# Patient Record
Sex: MALE | Race: WHITE | NOT HISPANIC OR LATINO | Employment: OTHER | URBAN - METROPOLITAN AREA
[De-identification: names, ages, dates, MRNs, and addresses within clinical notes are randomized per-mention and may not be internally consistent; named-entity substitution may affect disease eponyms.]

---

## 2022-01-14 ENCOUNTER — HOSPITAL ENCOUNTER (EMERGENCY)
Facility: HOSPITAL | Age: 77
Discharge: ANOTHER HOSPITAL | End: 2022-01-14
Attending: FAMILY MEDICINE
Payer: MEDICARE

## 2022-01-14 ENCOUNTER — HOSPITAL ENCOUNTER (INPATIENT)
Facility: HOSPITAL | Age: 77
LOS: 4 days | Discharge: HOME | DRG: 246 | End: 2022-01-18
Attending: FAMILY MEDICINE | Admitting: FAMILY MEDICINE
Payer: MEDICARE

## 2022-01-14 VITALS
TEMPERATURE: 98 F | HEIGHT: 72 IN | BODY MASS INDEX: 32.51 KG/M2 | DIASTOLIC BLOOD PRESSURE: 77 MMHG | SYSTOLIC BLOOD PRESSURE: 165 MMHG | OXYGEN SATURATION: 92 % | RESPIRATION RATE: 11 BRPM | HEART RATE: 55 BPM | WEIGHT: 240 LBS

## 2022-01-14 DIAGNOSIS — R07.9 CHEST PAIN: ICD-10-CM

## 2022-01-14 DIAGNOSIS — I21.4 NSTEMI (NON-ST ELEVATED MYOCARDIAL INFARCTION): Primary | ICD-10-CM

## 2022-01-14 DIAGNOSIS — I24.9 ACS (ACUTE CORONARY SYNDROME): Primary | ICD-10-CM

## 2022-01-14 DIAGNOSIS — R79.89 ELEVATED TROPONIN: ICD-10-CM

## 2022-01-14 DIAGNOSIS — I21.4 NSTEMI (NON-ST ELEVATED MYOCARDIAL INFARCTION): ICD-10-CM

## 2022-01-14 PROBLEM — G47.30 SLEEP APNEA: Chronic | Status: ACTIVE | Noted: 2022-01-14

## 2022-01-14 PROBLEM — F43.10 PTSD (POST-TRAUMATIC STRESS DISORDER): Status: ACTIVE | Noted: 2022-01-14

## 2022-01-14 PROBLEM — E11.9 TYPE 2 DIABETES MELLITUS, WITHOUT LONG-TERM CURRENT USE OF INSULIN: Status: ACTIVE | Noted: 2022-01-14

## 2022-01-14 PROBLEM — I25.10 CORONARY ARTERY DISEASE INVOLVING NATIVE CORONARY ARTERY: Chronic | Status: ACTIVE | Noted: 2022-01-14

## 2022-01-14 PROBLEM — F32.A DEPRESSION: Status: ACTIVE | Noted: 2022-01-14

## 2022-01-14 PROBLEM — I10 HTN (HYPERTENSION): Status: ACTIVE | Noted: 2022-01-14

## 2022-01-14 LAB
ALBUMIN SERPL BCP-MCNC: 3.7 G/DL (ref 3.5–5.2)
ALP SERPL-CCNC: 59 U/L (ref 55–135)
ALT SERPL W/O P-5'-P-CCNC: 33 U/L (ref 10–44)
ANION GAP SERPL CALC-SCNC: 12 MMOL/L (ref 8–16)
AST SERPL-CCNC: 20 U/L (ref 10–40)
BASOPHILS # BLD AUTO: 0.03 K/UL (ref 0–0.2)
BASOPHILS NFR BLD: 0.4 % (ref 0–1.9)
BILIRUB SERPL-MCNC: 0.3 MG/DL (ref 0.1–1)
BNP SERPL-MCNC: 22 PG/ML (ref 0–99)
BUN SERPL-MCNC: 23 MG/DL (ref 8–23)
CALCIUM SERPL-MCNC: 9.1 MG/DL (ref 8.7–10.5)
CHLORIDE SERPL-SCNC: 103 MMOL/L (ref 95–110)
CO2 SERPL-SCNC: 25 MMOL/L (ref 23–29)
CREAT SERPL-MCNC: 1 MG/DL (ref 0.5–1.4)
DIFFERENTIAL METHOD: NORMAL
EOSINOPHIL # BLD AUTO: 0.1 K/UL (ref 0–0.5)
EOSINOPHIL NFR BLD: 1.5 % (ref 0–8)
ERYTHROCYTE [DISTWIDTH] IN BLOOD BY AUTOMATED COUNT: 12.9 % (ref 11.5–14.5)
EST. GFR  (AFRICAN AMERICAN): >60 ML/MIN/1.73 M^2
EST. GFR  (NON AFRICAN AMERICAN): >60 ML/MIN/1.73 M^2
GLUCOSE SERPL-MCNC: 235 MG/DL (ref 70–110)
HCT VFR BLD AUTO: 42.9 % (ref 40–54)
HGB BLD-MCNC: 14.2 G/DL (ref 14–18)
IMM GRANULOCYTES # BLD AUTO: 0.03 K/UL (ref 0–0.04)
IMM GRANULOCYTES NFR BLD AUTO: 0.4 % (ref 0–0.5)
INR PPP: 1 (ref 0.8–1.2)
LYMPHOCYTES # BLD AUTO: 2.3 K/UL (ref 1–4.8)
LYMPHOCYTES NFR BLD: 31.8 % (ref 18–48)
MCH RBC QN AUTO: 29.4 PG (ref 27–31)
MCHC RBC AUTO-ENTMCNC: 33.1 G/DL (ref 32–36)
MCV RBC AUTO: 89 FL (ref 82–98)
MONOCYTES # BLD AUTO: 0.5 K/UL (ref 0.3–1)
MONOCYTES NFR BLD: 6.7 % (ref 4–15)
NEUTROPHILS # BLD AUTO: 4.4 K/UL (ref 1.8–7.7)
NEUTROPHILS NFR BLD: 59.2 % (ref 38–73)
NRBC BLD-RTO: 0 /100 WBC
PLATELET # BLD AUTO: 171 K/UL (ref 150–450)
PMV BLD AUTO: 10 FL (ref 9.2–12.9)
POTASSIUM SERPL-SCNC: 4.4 MMOL/L (ref 3.5–5.1)
PROT SERPL-MCNC: 6.8 G/DL (ref 6–8.4)
PROTHROMBIN TIME: 10.4 SEC (ref 9–12.5)
RBC # BLD AUTO: 4.83 M/UL (ref 4.6–6.2)
SARS-COV-2 RDRP RESP QL NAA+PROBE: NEGATIVE
SODIUM SERPL-SCNC: 140 MMOL/L (ref 136–145)
TROPONIN I SERPL DL<=0.01 NG/ML-MCNC: 0.01 NG/ML (ref 0–0.03)
TROPONIN I SERPL DL<=0.01 NG/ML-MCNC: 0.07 NG/ML (ref 0–0.03)
WBC # BLD AUTO: 7.36 K/UL (ref 3.9–12.7)

## 2022-01-14 PROCEDURE — 63600175 PHARM REV CODE 636 W HCPCS: Performed by: FAMILY MEDICINE

## 2022-01-14 PROCEDURE — 71045 XR CHEST AP PORTABLE: ICD-10-PCS | Mod: 26,,, | Performed by: RADIOLOGY

## 2022-01-14 PROCEDURE — 99285 EMERGENCY DEPT VISIT HI MDM: CPT | Mod: 25

## 2022-01-14 PROCEDURE — U0002 COVID-19 LAB TEST NON-CDC: HCPCS | Performed by: FAMILY MEDICINE

## 2022-01-14 PROCEDURE — 94761 N-INVAS EAR/PLS OXIMETRY MLT: CPT

## 2022-01-14 PROCEDURE — 25000003 PHARM REV CODE 250: Performed by: FAMILY MEDICINE

## 2022-01-14 PROCEDURE — 11000001 HC ACUTE MED/SURG PRIVATE ROOM

## 2022-01-14 PROCEDURE — 96375 TX/PRO/DX INJ NEW DRUG ADDON: CPT

## 2022-01-14 PROCEDURE — 85610 PROTHROMBIN TIME: CPT | Performed by: FAMILY MEDICINE

## 2022-01-14 PROCEDURE — 85025 COMPLETE CBC W/AUTO DIFF WBC: CPT | Performed by: FAMILY MEDICINE

## 2022-01-14 PROCEDURE — 93005 ELECTROCARDIOGRAM TRACING: CPT

## 2022-01-14 PROCEDURE — 84484 ASSAY OF TROPONIN QUANT: CPT | Mod: 91 | Performed by: FAMILY MEDICINE

## 2022-01-14 PROCEDURE — 71045 X-RAY EXAM CHEST 1 VIEW: CPT | Mod: 26,,, | Performed by: RADIOLOGY

## 2022-01-14 PROCEDURE — 36415 COLL VENOUS BLD VENIPUNCTURE: CPT | Performed by: FAMILY MEDICINE

## 2022-01-14 PROCEDURE — 82962 GLUCOSE BLOOD TEST: CPT

## 2022-01-14 PROCEDURE — 93010 EKG 12-LEAD: ICD-10-PCS | Mod: ,,, | Performed by: INTERNAL MEDICINE

## 2022-01-14 PROCEDURE — 96374 THER/PROPH/DIAG INJ IV PUSH: CPT

## 2022-01-14 PROCEDURE — 93010 ELECTROCARDIOGRAM REPORT: CPT | Mod: ,,, | Performed by: INTERNAL MEDICINE

## 2022-01-14 PROCEDURE — 71045 X-RAY EXAM CHEST 1 VIEW: CPT | Mod: TC,FY

## 2022-01-14 PROCEDURE — 80053 COMPREHEN METABOLIC PANEL: CPT | Performed by: FAMILY MEDICINE

## 2022-01-14 PROCEDURE — 83880 ASSAY OF NATRIURETIC PEPTIDE: CPT | Performed by: FAMILY MEDICINE

## 2022-01-14 RX ORDER — PRAZOSIN HYDROCHLORIDE 5 MG/1
5 CAPSULE ORAL NIGHTLY
COMMUNITY

## 2022-01-14 RX ORDER — ONDANSETRON 2 MG/ML
4 INJECTION INTRAMUSCULAR; INTRAVENOUS
Status: COMPLETED | OUTPATIENT
Start: 2022-01-14 | End: 2022-01-14

## 2022-01-14 RX ORDER — MELOXICAM 15 MG/1
15 TABLET ORAL DAILY
COMMUNITY

## 2022-01-14 RX ORDER — VENLAFAXINE 100 MG/1
TABLET ORAL 2 TIMES DAILY
COMMUNITY

## 2022-01-14 RX ORDER — GLIPIZIDE 10 MG/1
10 TABLET ORAL
COMMUNITY

## 2022-01-14 RX ORDER — ASPIRIN 81 MG/1
81 TABLET ORAL DAILY
COMMUNITY

## 2022-01-14 RX ORDER — MORPHINE SULFATE 4 MG/ML
4 INJECTION, SOLUTION INTRAMUSCULAR; INTRAVENOUS
Status: COMPLETED | OUTPATIENT
Start: 2022-01-14 | End: 2022-01-14

## 2022-01-14 RX ORDER — MORPHINE SULFATE 4 MG/ML
2 INJECTION, SOLUTION INTRAMUSCULAR; INTRAVENOUS
Status: COMPLETED | OUTPATIENT
Start: 2022-01-14 | End: 2022-01-14

## 2022-01-14 RX ORDER — LISINOPRIL 10 MG/1
10 TABLET ORAL DAILY
COMMUNITY

## 2022-01-14 RX ORDER — ATORVASTATIN CALCIUM 40 MG/1
40 TABLET, FILM COATED ORAL DAILY
Status: ON HOLD | COMMUNITY
End: 2022-01-18 | Stop reason: SDUPTHER

## 2022-01-14 RX ORDER — LANOLIN ALCOHOL/MO/W.PET/CERES
400 CREAM (GRAM) TOPICAL DAILY
COMMUNITY

## 2022-01-14 RX ORDER — NAPROXEN SODIUM 220 MG/1
324 TABLET, FILM COATED ORAL
Status: COMPLETED | OUTPATIENT
Start: 2022-01-14 | End: 2022-01-14

## 2022-01-14 RX ORDER — AMOXICILLIN 500 MG
CAPSULE ORAL DAILY
Status: ON HOLD | COMMUNITY
End: 2022-01-15

## 2022-01-14 RX ORDER — METFORMIN HYDROCHLORIDE 1000 MG/1
1000 TABLET ORAL 2 TIMES DAILY WITH MEALS
COMMUNITY

## 2022-01-14 RX ADMIN — MORPHINE SULFATE 2 MG: 4 INJECTION, SOLUTION INTRAMUSCULAR; INTRAVENOUS at 06:01

## 2022-01-14 RX ADMIN — ONDANSETRON 4 MG: 2 INJECTION INTRAMUSCULAR; INTRAVENOUS at 06:01

## 2022-01-14 RX ADMIN — MORPHINE SULFATE 4 MG: 4 INJECTION INTRAVENOUS at 07:01

## 2022-01-14 RX ADMIN — ASPIRIN 324 MG: 81 TABLET, CHEWABLE ORAL at 06:01

## 2022-01-14 NOTE — Clinical Note
The catheter was inserted into the ostium   left main. An angiography was performed of the left coronary arteries. Multiple views were taken. The angiography was performed via hand injection with 40 mL of contrast.

## 2022-01-14 NOTE — Clinical Note
The site was marked. The right groin was prepped. The site was prepped with ChloraPrep. The site was clipped. The patient was draped. The patient was positioned supine.

## 2022-01-14 NOTE — ED TRIAGE NOTES
Pt to ED with c/o chest pain that started around 0400 this morning, pt reports taking 2 nitro and 4 baby aspirin with no pain relief. Pt reports pain at 5/10 at this time, NAD noted at this time.

## 2022-01-14 NOTE — Clinical Note
The catheter was inserted into the left ventricle. Hemodynamics were performed.  and Pullback was recorded.  An angiography was performed of the LV. The angiography was performed via power injection. The injected amount was 30 mL contrast at 10 mL/s.

## 2022-01-14 NOTE — ED PROVIDER NOTES
Encounter Date: 1/14/2022       History     Chief Complaint   Patient presents with    Chest Pain     Onset  0430 this am took 2 nitro with baby asa no better     76-year-old male presents the ED complaining of dull left chest pain onset 0430, the patient took 2 nitro questions and a baby aspirin with no relief, he is visiting the area from [place] and is staying in a recreational vehicle at a local campground, has no history of nausea vomiting no syncope or near-syncope in no history of palpitations he is status post cardiac catheterization with stent placement apparently in the LAD in 2005 this was done in [place] he denies any prior similar pain recently and has had no unusual exertion or physical activity recently        Review of patient's allergies indicates:  No Known Allergies  No past medical history on file.  No past surgical history on file.  No family history on file.     Review of Systems   Constitutional: Negative for fever.   HENT: Negative for sore throat.    Respiratory: Negative for shortness of breath.    Cardiovascular: Negative for chest pain.   Gastrointestinal: Negative for nausea.   Genitourinary: Negative for dysuria.   Musculoskeletal: Negative for back pain.   Skin: Negative for rash.   Neurological: Negative for dizziness and weakness.   Hematological: Does not bruise/bleed easily.   Psychiatric/Behavioral: Negative for agitation.       Physical Exam     Initial Vitals   BP Pulse Resp Temp SpO2   01/14/22 0627 01/14/22 0627 01/14/22 0627 01/14/22 0732 01/14/22 0627   (!) 162/67 (!) 49 18 97.5 °F (36.4 °C) 96 %      MAP       --                Physical Exam    Nursing note and vitals reviewed.  Constitutional: He appears well-developed and well-nourished. He is not diaphoretic. No distress.   HENT:   Head: Normocephalic and atraumatic.   Nose: Nose normal.   Mouth/Throat: Oropharynx is clear and moist. No oropharyngeal exudate.   Eyes: EOM are normal.   Neck: Neck supple. No tracheal  deviation present.   Normal range of motion.  Cardiovascular: Normal rate and regular rhythm.   No murmur heard.  Pulmonary/Chest: Breath sounds normal. No stridor. No respiratory distress. He has no rales.   Abdominal: Abdomen is soft. He exhibits no distension and no mass. There is no abdominal tenderness. There is no rebound.   Musculoskeletal:         General: No edema. Normal range of motion.      Cervical back: Normal range of motion and neck supple.     Lymphadenopathy:     He has no cervical adenopathy.   Neurological: He is alert and oriented to person, place, and time. He has normal strength.   Skin: Skin is warm and dry. Capillary refill takes less than 2 seconds. No pallor.   Psychiatric: He has a normal mood and affect.         ED Course   Procedures  Labs Reviewed   COMPREHENSIVE METABOLIC PANEL - Abnormal; Notable for the following components:       Result Value    Glucose 235 (*)     All other components within normal limits   TROPONIN I - Abnormal; Notable for the following components:    Troponin I 0.065 (*)     All other components within normal limits    Narrative:     Recoll. [id number] by SABINA at 01/14/2022 10:27, reason: Insufficient   specimen   CBC W/ AUTO DIFFERENTIAL   B-TYPE NATRIURETIC PEPTIDE   TROPONIN I   PROTIME-INR     EKG Readings: (Independently Interpreted)   Initial Reading: No STEMI. Rhythm: Sinus Bradycardia. Heart Rate: 48. Ectopy: No Ectopy. Conduction: Normal. ST Segments: Normal ST Segments. T Waves: Normal.     ECG Results          EKG 12-lead (Final result)  Result time 01/14/22 08:26:59    Final result by Interface, Lab In [facility] (01/14/22 08:26:59)                 Narrative:    Test Reason : R07.9,    Vent. Rate : 048 BPM     Atrial Rate : 048 BPM     P-R Int : 192 ms          QRS Dur : 096 ms      QT Int : 474 ms       P-R-T Axes : 037 004 060 degrees     QTc Int : 423 ms    Sinus bradycardia  Cannot rule out Anterior infarct ,age undetermined  Abnormal  ECG    Confirmed by Omer Espino MD ([id number]) on 1/14/2022 8:26:52 AM    Referred By: [email or web address]   SELF           Confirmed By:Omer Espino MD                            Imaging Results          X-Ray Chest AP Portable (Final result)  Result time 01/14/22 07:48:20   Procedure changed from X-Ray Chest PA And Lateral     Final result by Robert Gomes MD (01/14/22 07:48:20)                 Impression:      No acute abnormality.      Electronically signed by: Robert Gomes  Date:    01/14/2022  Time:    07:48             Narrative:    EXAMINATION:  XR CHEST AP PORTABLE    CLINICAL HISTORY:  Chest Pain;.    TECHNIQUE:  Single frontal portable view of the chest was performed.    COMPARISON:  None    FINDINGS:  Support devices: None    The lungs are clear, with normal appearance of pulmonary vasculature and no pleural effusion or pneumothorax.    The cardiac silhouette is normal in size. The hilar and mediastinal contours are unremarkable.    Bones are intact.                                 Medications   aspirin chewable tablet 324 mg (324 mg Oral Given 1/14/22 0656)   ondansetron injection 4 mg (4 mg Intravenous Given 1/14/22 0655)   morphine injection 2 mg (2 mg Intravenous Given 1/14/22 0655)   morphine injection 4 mg (4 mg Intravenous Given 1/14/22 0724)   The evaluation, management and treatment of this patient involved Critical Care services  amounting to 75  minutes of direct involvement.  This time was exclusive of any billable procedures.              ED Course as of 01/16/22 1347   Fri Jan 14, 2022   0756 pt pain is down to a 310 [WK]   1057 Troponin I [WK]   1234 Pain is down to 2/10 [WK]   1600 Case discussed with [facility] pilot Dr.Marier, patient will be transferred to [facility] [WK]      ED Course User Index  [WK] Jamin Jose MD           The evaluation, management and treatment of this patient involved Critical Care services  amounting to 75 minutes of direct involvement.  This time  was exclusive of any billable procedures.  Clinical Impression:   Final diagnoses:  [R07.9] Chest pain  [I21.4] NSTEMI (non-ST elevated myocardial infarction) (Primary)          ED Disposition Condition    Transfer to Another Facility Stable              Jamin Jose MD  01/14/22 1340       Jamin Jose MD  01/16/22 1348       Jamin Jose MD  02/09/22 0252

## 2022-01-14 NOTE — Clinical Note
The catheter was repositioned into the ostium   right coronary artery. An angiography was performed of the right coronary arteries. Multiple views were taken. The angiography was performed via hand injection with 20 mL of contrast.

## 2022-01-14 NOTE — Clinical Note
The catheter was inserted into the ostium   left main. An angiography was performed of the left coronary arteries. The angiography was performed via hand injection with 10 mL of contrast.   Wound Care (No Sutures): Petrolatum

## 2022-01-14 NOTE — Clinical Note
An angiography was performed of the left coronary arteries. The angiography was performed via hand injection with 20 mL of contrast.

## 2022-01-15 PROBLEM — I24.9 ACS (ACUTE CORONARY SYNDROME): Status: RESOLVED | Noted: 2022-01-15 | Resolved: 2022-01-15

## 2022-01-15 PROBLEM — I24.9 ACS (ACUTE CORONARY SYNDROME): Status: ACTIVE | Noted: 2022-01-15

## 2022-01-15 LAB
ALBUMIN SERPL BCP-MCNC: 3.5 G/DL (ref 3.5–5.2)
ALP SERPL-CCNC: 60 U/L (ref 55–135)
ALT SERPL W/O P-5'-P-CCNC: 36 U/L (ref 10–44)
ANION GAP SERPL CALC-SCNC: 9 MMOL/L (ref 8–16)
APTT BLDCRRT: 28.3 SEC (ref 21–32)
AST SERPL-CCNC: 44 U/L (ref 10–40)
BASOPHILS # BLD AUTO: 0.03 K/UL (ref 0–0.2)
BASOPHILS NFR BLD: 0.3 % (ref 0–1.9)
BILIRUB SERPL-MCNC: 0.3 MG/DL (ref 0.1–1)
BUN SERPL-MCNC: 25 MG/DL (ref 8–23)
CALCIUM SERPL-MCNC: 8.9 MG/DL (ref 8.7–10.5)
CHLORIDE SERPL-SCNC: 106 MMOL/L (ref 95–110)
CHOLEST SERPL-MCNC: 167 MG/DL (ref 120–199)
CHOLEST/HDLC SERPL: 4.2 {RATIO} (ref 2–5)
CO2 SERPL-SCNC: 24 MMOL/L (ref 23–29)
CREAT SERPL-MCNC: 0.8 MG/DL (ref 0.5–1.4)
DIFFERENTIAL METHOD: NORMAL
EOSINOPHIL # BLD AUTO: 0.1 K/UL (ref 0–0.5)
EOSINOPHIL NFR BLD: 1.1 % (ref 0–8)
ERYTHROCYTE [DISTWIDTH] IN BLOOD BY AUTOMATED COUNT: 13 % (ref 11.5–14.5)
EST. GFR  (AFRICAN AMERICAN): >60 ML/MIN/1.73 M^2
EST. GFR  (NON AFRICAN AMERICAN): >60 ML/MIN/1.73 M^2
ESTIMATED AVG GLUCOSE: 171 MG/DL (ref 68–131)
GLUCOSE SERPL-MCNC: 119 MG/DL (ref 70–110)
HBA1C MFR BLD: 7.6 % (ref 4–5.6)
HCT VFR BLD AUTO: 43.3 % (ref 40–54)
HDLC SERPL-MCNC: 40 MG/DL (ref 40–75)
HDLC SERPL: 24 % (ref 20–50)
HGB BLD-MCNC: 14.1 G/DL (ref 14–18)
IMM GRANULOCYTES # BLD AUTO: 0.02 K/UL (ref 0–0.04)
IMM GRANULOCYTES NFR BLD AUTO: 0.2 % (ref 0–0.5)
INR PPP: 1 (ref 0.8–1.2)
LDLC SERPL CALC-MCNC: 94.2 MG/DL (ref 63–159)
LYMPHOCYTES # BLD AUTO: 2.8 K/UL (ref 1–4.8)
LYMPHOCYTES NFR BLD: 31.6 % (ref 18–48)
MAGNESIUM SERPL-MCNC: 1.8 MG/DL (ref 1.6–2.6)
MCH RBC QN AUTO: 28.7 PG (ref 27–31)
MCHC RBC AUTO-ENTMCNC: 32.6 G/DL (ref 32–36)
MCV RBC AUTO: 88 FL (ref 82–98)
MONOCYTES # BLD AUTO: 0.6 K/UL (ref 0.3–1)
MONOCYTES NFR BLD: 7.2 % (ref 4–15)
NEUTROPHILS # BLD AUTO: 5.3 K/UL (ref 1.8–7.7)
NEUTROPHILS NFR BLD: 59.6 % (ref 38–73)
NONHDLC SERPL-MCNC: 127 MG/DL
NRBC BLD-RTO: 0 /100 WBC
PHOSPHATE SERPL-MCNC: 3.3 MG/DL (ref 2.7–4.5)
PLATELET # BLD AUTO: 203 K/UL (ref 150–450)
PMV BLD AUTO: 10.2 FL (ref 9.2–12.9)
POCT GLUCOSE: 105 MG/DL (ref 70–110)
POCT GLUCOSE: 206 MG/DL (ref 70–110)
POCT GLUCOSE: 225 MG/DL (ref 70–110)
POCT GLUCOSE: 253 MG/DL (ref 70–110)
POTASSIUM SERPL-SCNC: 4 MMOL/L (ref 3.5–5.1)
PROT SERPL-MCNC: 6.9 G/DL (ref 6–8.4)
PROTHROMBIN TIME: 10.3 SEC (ref 9–12.5)
RBC # BLD AUTO: 4.91 M/UL (ref 4.6–6.2)
SODIUM SERPL-SCNC: 139 MMOL/L (ref 136–145)
TRIGL SERPL-MCNC: 164 MG/DL (ref 30–150)
TROPONIN I SERPL DL<=0.01 NG/ML-MCNC: 3.76 NG/ML (ref 0–0.03)
TROPONIN I SERPL DL<=0.01 NG/ML-MCNC: 4.92 NG/ML (ref 0–0.03)
TROPONIN I SERPL DL<=0.01 NG/ML-MCNC: 5.13 NG/ML (ref 0–0.03)
TROPONIN I SERPL DL<=0.01 NG/ML-MCNC: 5.63 NG/ML (ref 0–0.03)
TROPONIN I SERPL DL<=0.01 NG/ML-MCNC: 5.63 NG/ML (ref 0–0.03)
WBC # BLD AUTO: 8.95 K/UL (ref 3.9–12.7)

## 2022-01-15 PROCEDURE — 99223 1ST HOSP IP/OBS HIGH 75: CPT | Mod: ,,, | Performed by: INTERNAL MEDICINE

## 2022-01-15 PROCEDURE — 93005 ELECTROCARDIOGRAM TRACING: CPT

## 2022-01-15 PROCEDURE — 11000001 HC ACUTE MED/SURG PRIVATE ROOM

## 2022-01-15 PROCEDURE — 84484 ASSAY OF TROPONIN QUANT: CPT | Mod: 91 | Performed by: NURSE PRACTITIONER

## 2022-01-15 PROCEDURE — 93010 ELECTROCARDIOGRAM REPORT: CPT | Mod: ,,, | Performed by: INTERNAL MEDICINE

## 2022-01-15 PROCEDURE — 63600175 PHARM REV CODE 636 W HCPCS: Performed by: INTERNAL MEDICINE

## 2022-01-15 PROCEDURE — 25000003 PHARM REV CODE 250: Performed by: NURSE PRACTITIONER

## 2022-01-15 PROCEDURE — 85730 THROMBOPLASTIN TIME PARTIAL: CPT | Performed by: NURSE PRACTITIONER

## 2022-01-15 PROCEDURE — 36415 COLL VENOUS BLD VENIPUNCTURE: CPT | Performed by: NURSE PRACTITIONER

## 2022-01-15 PROCEDURE — 83735 ASSAY OF MAGNESIUM: CPT | Performed by: NURSE PRACTITIONER

## 2022-01-15 PROCEDURE — 99223 PR INITIAL HOSPITAL CARE,LEVL III: ICD-10-PCS | Mod: ,,, | Performed by: INTERNAL MEDICINE

## 2022-01-15 PROCEDURE — 84100 ASSAY OF PHOSPHORUS: CPT | Performed by: NURSE PRACTITIONER

## 2022-01-15 PROCEDURE — 85610 PROTHROMBIN TIME: CPT | Performed by: NURSE PRACTITIONER

## 2022-01-15 PROCEDURE — 63600175 PHARM REV CODE 636 W HCPCS: Performed by: NURSE PRACTITIONER

## 2022-01-15 PROCEDURE — 80053 COMPREHEN METABOLIC PANEL: CPT | Performed by: NURSE PRACTITIONER

## 2022-01-15 PROCEDURE — 80061 LIPID PANEL: CPT | Performed by: NURSE PRACTITIONER

## 2022-01-15 PROCEDURE — 83036 HEMOGLOBIN GLYCOSYLATED A1C: CPT | Performed by: NURSE PRACTITIONER

## 2022-01-15 PROCEDURE — 93010 EKG 12-LEAD: ICD-10-PCS | Mod: ,,, | Performed by: INTERNAL MEDICINE

## 2022-01-15 PROCEDURE — 25000003 PHARM REV CODE 250: Performed by: INTERNAL MEDICINE

## 2022-01-15 PROCEDURE — 85025 COMPLETE CBC W/AUTO DIFF WBC: CPT | Performed by: NURSE PRACTITIONER

## 2022-01-15 RX ORDER — LISINOPRIL 10 MG/1
10 TABLET ORAL DAILY
Status: DISCONTINUED | OUTPATIENT
Start: 2022-01-15 | End: 2022-01-18 | Stop reason: HOSPADM

## 2022-01-15 RX ORDER — OMEGA-3 FATTY ACIDS 1000 MG
2 CAPSULE ORAL
COMMUNITY

## 2022-01-15 RX ORDER — OMEPRAZOLE 20 MG/1
1 CAPSULE, DELAYED RELEASE ORAL DAILY
COMMUNITY
Start: 2021-08-15

## 2022-01-15 RX ORDER — CLOPIDOGREL BISULFATE 75 MG/1
300 TABLET ORAL ONCE
Status: COMPLETED | OUTPATIENT
Start: 2022-01-15 | End: 2022-01-15

## 2022-01-15 RX ORDER — ACETAMINOPHEN 160 MG/5ML
200 SUSPENSION, ORAL (FINAL DOSE FORM) ORAL
COMMUNITY

## 2022-01-15 RX ORDER — ONDANSETRON 2 MG/ML
4 INJECTION INTRAMUSCULAR; INTRAVENOUS EVERY 8 HOURS PRN
Status: DISCONTINUED | OUTPATIENT
Start: 2022-01-15 | End: 2022-01-18 | Stop reason: HOSPADM

## 2022-01-15 RX ORDER — GLUCAGON 1 MG
1 KIT INJECTION
Status: DISCONTINUED | OUTPATIENT
Start: 2022-01-15 | End: 2022-01-18 | Stop reason: HOSPADM

## 2022-01-15 RX ORDER — AMOXICILLIN 500 MG/1
TABLET, FILM COATED ORAL
Status: ON HOLD | COMMUNITY
Start: 2021-12-07 | End: 2022-01-15

## 2022-01-15 RX ORDER — DOXEPIN HYDROCHLORIDE 10 MG/1
10 CAPSULE ORAL
COMMUNITY

## 2022-01-15 RX ORDER — ACETAMINOPHEN 500 MG
TABLET ORAL
COMMUNITY

## 2022-01-15 RX ORDER — SODIUM CHLORIDE 0.9 % (FLUSH) 0.9 %
10 SYRINGE (ML) INJECTION
Status: DISCONTINUED | OUTPATIENT
Start: 2022-01-15 | End: 2022-01-18 | Stop reason: HOSPADM

## 2022-01-15 RX ORDER — MIRTAZAPINE 7.5 MG/1
30 TABLET, FILM COATED ORAL NIGHTLY PRN
Status: DISCONTINUED | OUTPATIENT
Start: 2022-01-15 | End: 2022-01-18 | Stop reason: HOSPADM

## 2022-01-15 RX ORDER — CLOPIDOGREL BISULFATE 75 MG/1
75 TABLET ORAL DAILY
Status: DISCONTINUED | OUTPATIENT
Start: 2022-01-16 | End: 2022-01-18 | Stop reason: HOSPADM

## 2022-01-15 RX ORDER — ENOXAPARIN SODIUM 100 MG/ML
1 INJECTION SUBCUTANEOUS
Status: DISCONTINUED | OUTPATIENT
Start: 2022-01-15 | End: 2022-01-17

## 2022-01-15 RX ORDER — ROSUVASTATIN CALCIUM 20 MG/1
20 TABLET, COATED ORAL
Status: ON HOLD | COMMUNITY
End: 2022-01-15

## 2022-01-15 RX ORDER — ACETAMINOPHEN 325 MG/1
650 TABLET ORAL EVERY 4 HOURS PRN
Status: DISCONTINUED | OUTPATIENT
Start: 2022-01-15 | End: 2022-01-18 | Stop reason: HOSPADM

## 2022-01-15 RX ORDER — ACETAMINOPHEN 500 MG
500 TABLET ORAL EVERY 4 HOURS PRN
COMMUNITY

## 2022-01-15 RX ORDER — ASPIRIN 81 MG/1
81 TABLET ORAL DAILY
Status: DISCONTINUED | OUTPATIENT
Start: 2022-01-15 | End: 2022-01-15

## 2022-01-15 RX ORDER — ATORVASTATIN CALCIUM 40 MG/1
40 TABLET, FILM COATED ORAL DAILY
Status: DISCONTINUED | OUTPATIENT
Start: 2022-01-15 | End: 2022-01-18 | Stop reason: HOSPADM

## 2022-01-15 RX ORDER — CHOLECALCIFEROL (VITAMIN D3) 25 MCG
TABLET ORAL
COMMUNITY

## 2022-01-15 RX ORDER — INSULIN ASPART 100 [IU]/ML
0-5 INJECTION, SOLUTION INTRAVENOUS; SUBCUTANEOUS EVERY 6 HOURS PRN
Status: DISCONTINUED | OUTPATIENT
Start: 2022-01-15 | End: 2022-01-18 | Stop reason: HOSPADM

## 2022-01-15 RX ORDER — CANAGLIFLOZIN 300 MG/1
TABLET, FILM COATED ORAL
COMMUNITY
Start: 2021-11-14

## 2022-01-15 RX ORDER — TALC
6 POWDER (GRAM) TOPICAL NIGHTLY PRN
Status: DISCONTINUED | OUTPATIENT
Start: 2022-01-15 | End: 2022-01-18 | Stop reason: HOSPADM

## 2022-01-15 RX ORDER — METOPROLOL TARTRATE 25 MG/1
25 TABLET, FILM COATED ORAL
COMMUNITY

## 2022-01-15 RX ORDER — ATORVASTATIN CALCIUM 40 MG/1
80 TABLET, FILM COATED ORAL DAILY
Status: DISCONTINUED | OUTPATIENT
Start: 2022-01-15 | End: 2022-01-15

## 2022-01-15 RX ORDER — ASPIRIN 81 MG/1
81 TABLET ORAL DAILY
Status: DISCONTINUED | OUTPATIENT
Start: 2022-01-15 | End: 2022-01-18 | Stop reason: HOSPADM

## 2022-01-15 RX ORDER — ENOXAPARIN SODIUM 100 MG/ML
40 INJECTION SUBCUTANEOUS EVERY 24 HOURS
Status: DISCONTINUED | OUTPATIENT
Start: 2022-01-15 | End: 2022-01-15

## 2022-01-15 RX ORDER — EPINEPHRINE 0.22MG
200 AEROSOL WITH ADAPTER (ML) INHALATION
Status: ON HOLD | COMMUNITY
End: 2022-01-15

## 2022-01-15 RX ORDER — MIRTAZAPINE 30 MG/1
30 TABLET, FILM COATED ORAL
COMMUNITY

## 2022-01-15 RX ORDER — METOPROLOL TARTRATE 25 MG/1
25 TABLET, FILM COATED ORAL DAILY
Status: DISCONTINUED | OUTPATIENT
Start: 2022-01-15 | End: 2022-01-18 | Stop reason: HOSPADM

## 2022-01-15 RX ADMIN — ATORVASTATIN CALCIUM 40 MG: 40 TABLET, FILM COATED ORAL at 08:01

## 2022-01-15 RX ADMIN — ASPIRIN 81 MG: 81 TABLET, COATED ORAL at 08:01

## 2022-01-15 RX ADMIN — LISINOPRIL 10 MG: 10 TABLET ORAL at 08:01

## 2022-01-15 RX ADMIN — VENLAFAXINE 100 MG: 25 TABLET ORAL at 08:01

## 2022-01-15 RX ADMIN — ENOXAPARIN SODIUM 100 MG: 100 INJECTION SUBCUTANEOUS at 12:01

## 2022-01-15 RX ADMIN — METOPROLOL TARTRATE 25 MG: 25 TABLET, FILM COATED ORAL at 08:01

## 2022-01-15 RX ADMIN — CLOPIDOGREL 300 MG: 75 TABLET, FILM COATED ORAL at 12:01

## 2022-01-15 RX ADMIN — INSULIN ASPART 3 UNITS: 100 INJECTION, SOLUTION INTRAVENOUS; SUBCUTANEOUS at 12:01

## 2022-01-15 RX ADMIN — ENOXAPARIN SODIUM 100 MG: 100 INJECTION SUBCUTANEOUS at 11:01

## 2022-01-15 NOTE — NURSING
Received pt to room 479 via EMS transport from [place]. Pt admission completed at this time.Telemetry placed and on call service notified of pts arrival. Call light and personal items are placed within pts reach.Instructed the pt to call for assistance before attempting to get out of the bed. Pt verbalizes understanding. Bed alarm set.Pt vital signs are stable and denies any chest or any discomfort at this time. Will continue to monitor.

## 2022-01-15 NOTE — CARE UPDATE
Call from bedside nurse Marisa in regard to troponin result. PTA 0.006-0.065 now at 5.634. EKG without major acute changes, vitals with slight hypertension yet not drastically elevated, denies CP, SOB or any discomfort. Spoke to Dr. Seymour in this matter and orders were to remain as is with no new orders and to continue to monitor and notify him if any changes.

## 2022-01-15 NOTE — HPI
Hilario Vivar is a 76 year old male with a PMHx of Hilario Vivar is a 76 y.o. male patient with a past medical history of CAD (AMI 1980's, proximal LAD PCI 1995, distal circ stents 2005), PAF/atrial flutter s/p ablation, HTN, HLD, Osteoarthritis and DM who presented to [place] ED ~ 9:30 am on 1/14/21 with complaints of chest pain, unrelieved by nitroglycerin SL X 3 and 4 81 mg Aspirin. The chest pain started about 4:30 am. Patient reports taking nitro and aspiring but with no relief of pain. Denies pain radiation reports substernal pain. Pain was reported 6/10 then decreased to 2/10 after morphine and  mg. While there he was noted to have VS (0627 h) HR 49, /67, RR 18, SpO2 96% (RA).  Labs:  CBC and CMP unremarkable except for Glu 235, troponin 0.006 > 0.065, BNP 22, COVID neg. EKG sinus bradycardia (48) with no acute changes, CXR clear. Patient given aspirin (324) and morphine. Over the next few h the pain improved (6 > 2).  Patient resting comfortably.  Transfer requested for higher level of care (cardiology).

## 2022-01-15 NOTE — NURSING
Pt.s Troponin level increased. On call notified of changes.Stat EKG ordered at this time. Pt vital signs are stable and he denies chest pain ,pressure or discomfort at this time. Will continue to monitor.

## 2022-01-15 NOTE — PROGRESS NOTES
[facility]  Progress Note    Patient Name: Hilario Vivar  MRN: [id number]  Patient Class: IP- Inpatient   Admission Date: 1/14/2022  Length of Stay: 1 days  Attending Physician: Patsy Burkett*  Primary Care Provider: Arthur Cavazos MD        Subjective:     Principal Problem:NSTEMI (non-ST elevated myocardial infarction)        HPI:  Hilario Vivar is a 76 year old male with a PMHx of Hilario Vivar is a 76 y.o. male patient with a past medical history of CAD (AMI 1980's, proximal LAD PCI 1995, distal circ stents 2005), PAF/atrial flutter s/p ablation, HTN, HLD, Osteoarthritis and DM who presented to [place] ED ~ 9:30 am on 1/14/21 with complaints of chest pain, unrelieved by nitroglycerin SL X 3 and 4 81 mg Aspirin. The chest pain started about 4:30 am. Patient reports taking nitro and aspiring but with no relief of pain. Denies pain radiation reports substernal pain. Pain was reported 6/10 then decreased to 2/10 after morphine and  mg. While there he was noted to have VS (0627 h) HR 49, /67, RR 18, SpO2 96% (RA).  Labs:  CBC and CMP unremarkable except for Glu 235, troponin 0.006 > 0.065, BNP 22, COVID neg. EKG sinus bradycardia (48) with no acute changes, CXR clear. Patient given aspirin (324) and morphine. Over the next few h the pain improved (6 > 2).  Patient resting comfortably.  Transfer requested for higher level of care (cardiology).                       Overview/Hospital Course:  No notes on file    Interval History: Seen at the bedside--no distress noted. Complains of chest soreness. Awaiting cardiology eval.     Review of Systems   Constitutional: Negative for diaphoresis and fatigue.   HENT: Negative for trouble swallowing.    Eyes: Negative for visual disturbance.   Respiratory: Negative for cough and shortness of breath.    Cardiovascular: Positive for chest pain.   Gastrointestinal: Negative for diarrhea, nausea and vomiting.    Genitourinary: Negative for difficulty urinating.   Musculoskeletal: Negative for gait problem and myalgias.   Neurological: Positive for weakness.   Hematological: Does not bruise/bleed easily.   Psychiatric/Behavioral: Negative for confusion.     Objective:     Vital Signs (Most Recent):  Temp: 98.6 °F (37 °C) (01/15/22 1224)  Pulse: 72 (01/15/22 1224)  Resp: 20 (01/15/22 1224)  BP: 133/67 (01/15/22 1224)  SpO2: 96 % (01/15/22 1224) Vital Signs (24h Range):  Temp:  [95.8 °F (35.4 °C)-98.6 °F (37 °C)] 98.6 °F (37 °C)  Pulse:  [53-73] 72  Resp:  [7-20] 20  SpO2:  [83 %-96 %] 96 %  BP: (116-165)/(61-80) 133/67     Weight: 103 kg (227 lb 1.2 oz)  Body mass index is 30.8 kg/m².  No intake or output data in the 24 hours ending 01/15/22 1334   Physical Exam  Vitals and nursing note reviewed.   Constitutional:       Appearance: He is obese.   HENT:      Head: Normocephalic and atraumatic.      Mouth/Throat:      Mouth: Mucous membranes are moist.   Eyes:      Extraocular Movements: Extraocular movements intact.      Conjunctiva/sclera: Conjunctivae normal.   Cardiovascular:      Rate and Rhythm: Normal rate and regular rhythm.      Pulses: Normal pulses.   Pulmonary:      Effort: Pulmonary effort is normal. No respiratory distress.   Abdominal:      General: There is no distension.      Tenderness: There is no abdominal tenderness.   Musculoskeletal:         General: Normal range of motion.      Cervical back: Normal range of motion.   Skin:     General: Skin is warm and dry.      Capillary Refill: Capillary refill takes 2 to 3 seconds.   Neurological:      Mental Status: He is alert and oriented to person, place, and time.   Psychiatric:         Mood and Affect: Mood normal.         Significant Labs: All pertinent labs within the past 24 hours have been reviewed.    Significant Imaging: I have reviewed all pertinent imaging results/findings within the past 24 hours.      Assessment/Plan:      * NSTEMI (non-ST elevated  myocardial infarction)  Transferred from [place] for ACS work up  History of MI in 80s treated medically   Troponin 0.006-0.065-5.634  Received  mg and morphine PTA with some pain relief, EKG with Sinus bradycardia with 1st degree A-V block. Cannot rule out Anterior infarct  No current complaints of CP or SOB  With last troponin level increase repeat EKG with: Sinus rhythm with 1st degree A-V block with occasional Premature ventricular complexes. Possible Anterior infarct  -trend troponin  -trend EKG  -NPO  -ECHO pending  -appreciate cardiology  -loaded with Plavix  -full dose Lovenox        Sleep apnea  CPAP QHS      PTSD (post-traumatic stress disorder)  Depression    Takes prazosin, Effexor, Remeron, at home  Will resume      Type 2 diabetes mellitus, without long-term current use of insulin  Hemoglobin A1C   Date Value Ref Range Status   01/15/2022 7.6 (H) 4.0 - 5.6 % Final     Comment:     ADA Screening Guidelines:  5.7-6.4%  Consistent with prediabetes  >or=6.5%  Consistent with diabetes    High levels of fetal hemoglobin interfere with the HbA1C  assay. Heterozygous hemoglobin variants (HbS, HgC, etc)do  not significantly interfere with this assay.   However, presence of multiple variants may affect accuracy.       -A1C pending, SSI, accuchecks Q6 while NPO, diabetic diet once allowed to eat      HTN (hypertension)  Hypertensive on admission  Takes lisinopril, metoprolol at home-resume and monitor      Coronary artery disease involving native coronary artery  PCI proximal LAD in 1995, Coronary stents in distal circumflex 2005   Continue home ASA, coreg, statin        VTE Risk Mitigation (From admission, onward)         Ordered     enoxaparin injection 100 mg  Every 12 hours (non-standard times)         01/15/22 1127     IP VTE HIGH RISK PATIENT  Once         01/15/22 0015     Place sequential compression device  Until discontinued         01/15/22 0015                Discharge Planning   ALONDRA:      Code  Status: Full Code   Is the patient medically ready for discharge?:     Reason for patient still in hospital (select all that apply): Patient trending condition, Laboratory test, Treatment, Imaging and Consult recommendations                     Sandra Neely NP  Department of [facility]   [facility]

## 2022-01-15 NOTE — ASSESSMENT & PLAN NOTE
PCI proximal LAD in 1995, Coronary stents in distal circumflex 2005   Continue home ASA, coreg, statin

## 2022-01-15 NOTE — ASSESSMENT & PLAN NOTE
Transferred from [place] for ACS work up  History of MI in 80s treated medically   Troponin 0.006-0.065-5.634  Received  mg and morphine PTA with some pain relief, EKG with Sinus bradycardia with 1st degree A-V block. Cannot rule out Anterior infarct  No current complaints of CP or SOB  With last troponin level increase repeat EKG with: Sinus rhythm with 1st degree A-V block with occasional Premature ventricular complexes. Possible Anterior infarct  -trend troponin  -trend EKG  -NPO  -ECHO pending  -appreciate cardiology Dr. Berry with no added changes at this time

## 2022-01-15 NOTE — PLAN OF CARE
Problem: Adult Inpatient Plan of Care  Goal: Plan of Care Review  Outcome: Ongoing, Progressing   VN cued into room to complete admit assessment. VIP model introduced; VN working alongside bedside treatment team.  Plan of care reviewed with patient. Patient informed of fall risk, fall precautions, call light within reach, side rails x2 elevated. Patient notified to ask staff for assistance. Patient verbalized complete understanding. Time allowed for questions. Will continue to monitor and intervene as needed. Chart review complete.

## 2022-01-15 NOTE — CONSULTS
[facility] - Telemetry  Cardiology  Consult Note    Patient Name: Hilario Vivar  MRN: [id number]  Admission Date: 1/14/2022  Hospital Length of Stay: 1 days  Code Status: Full Code   Attending Provider: Patsy Burkett*   Consulting Provider: Rivas Conner MD  Primary Care Physician: Arthur Cavazos MD  Principal Problem:NSTEMI (non-ST elevated myocardial infarction)    Patient information was obtained from patient and ER records.     Consults  Subjective:     Chief Complaint:  NSTEMI     HPI:   75 y/o male with hx of CAD s/p PCI  (AMI 1980's, proximal LAD PCI 1995, distal circ stents 2005), PAfib s/p RFA, HTN, DM who presented with CP and diagnosed with NSTEMI. Last trop 5.127. BP stable and no recurrent CP.    Past Medical History:   Diagnosis Date    AF (paroxysmal atrial fibrillation)     Atrial flutter     Coronary artery disease     Diabetes mellitus     Hypertension     Myocardial infarction     Osteoarthritis        History reviewed. No pertinent surgical history.    Review of patient's allergies indicates:  No Known Allergies    No current facility-administered medications on file prior to encounter.     Current Outpatient Medications on File Prior to Encounter   Medication Sig    acetaminophen (TYLENOL) 500 MG tablet Take 500 mg by mouth every 4 (four) hours as needed.    aspirin (ECOTRIN) 81 MG EC tablet Take 81 mg by mouth once daily.    atorvastatin (LIPITOR) 40 MG tablet Take 40 mg by mouth once daily.    canagliflozin (INVOKANA) 300 mg Tab tablet Take 1 tablet by mouth once daily.    cholecalciferol, vitamin D3, (VITAMIN D3) 50 mcg (2,000 unit) Cap Take by mouth.    coenzyme Q10 200 mg capsule Take 200 mg by mouth.    docosahexaenoic acid-epa 120-180 mg Cap Take 1,000 mg by mouth.    doxepin (SINEQUAN) 10 MG capsule Take 10 mg by mouth.    glipiZIDE (GLUCOTROL) 10 MG tablet Take 10 mg by mouth 2 (two) times daily before meals.    INV doxazosin (CARDURA) 8 MG Tab Take 4 mg  by mouth once daily. FOR INVESTIGATIONAL USE ONLY    INVOKANA 300 mg Tab tablet     lisinopriL 10 MG tablet Take 10 mg by mouth once daily.    magnesium oxide (MAG-OX) 400 mg (241.3 mg magnesium) tablet Take 400 mg by mouth once daily.    meloxicam (MOBIC) 15 MG tablet Take 15 mg by mouth once daily.    metFORMIN (GLUCOPHAGE) 1000 MG tablet Take 1,000 mg by mouth 2 (two) times daily with meals.    metoprolol tartrate (LOPRESSOR) 25 MG tablet Take 25 mg by mouth.    mirtazapine (REMERON) 30 MG tablet Take 30 mg by mouth.    multivitamin (MULTIPLE VITAMINS DAILY ORAL) Take by mouth.    omega-3 fatty acids 1,000 mg Cap Take 2 g by mouth.    omeprazole (PRILOSEC) 20 MG capsule Take 1 capsule by mouth once daily.    prazosin (MINIPRESS) 5 MG capsule Take 5 mg by mouth every evening.    tamsulosin HCl (FLOMAX ORAL) Take by mouth.    venlafaxine (EFFEXOR) 100 MG Tab Take by mouth 2 (two) times daily.    VITAMIN B COMPLEX ORAL Take by mouth.    vitamin D (VITAMIN D3) 1000 units Tab Take by mouth.    [DISCONTINUED] coenzyme Q10 100 mg capsule Take 200 mg by mouth.    [DISCONTINUED] omega-3 fatty acids/fish oil (FISH OIL-OMEGA-3 FATTY ACIDS) 300-1,000 mg capsule Take by mouth once daily.    [DISCONTINUED] amoxicillin (AMOXIL) 500 MG Tab TAKE 1 TABLET BY MOUTH EVERY 8 HOURS WITH FOOD UNTIL GONE    [DISCONTINUED] rosuvastatin (CRESTOR) 20 MG tablet Take 20 mg by mouth.     Family History    None       Tobacco Use    Smoking status: Not on file    Smokeless tobacco: Not on file   Substance and Sexual Activity    Alcohol use: Not on file    Drug use: Not on file    Sexual activity: Not on file     Review of Systems   Constitutional: Negative for malaise/fatigue.   HENT: Negative for congestion.    Eyes: Negative for blurred vision.   Cardiovascular: Negative for chest pain, claudication, cyanosis, dyspnea on exertion, irregular heartbeat, leg swelling, near-syncope, orthopnea, palpitations, paroxysmal  nocturnal dyspnea and syncope.   Respiratory: Negative for shortness of breath.    Endocrine: Negative for polyuria.   Hematologic/Lymphatic: Negative for bleeding problem.   Skin: Negative for itching and rash.   Musculoskeletal: Negative for joint swelling, muscle cramps and muscle weakness.   Gastrointestinal: Negative for abdominal pain, hematemesis, hematochezia, melena, nausea and vomiting.   Genitourinary: Negative for dysuria and hematuria.   Neurological: Negative for dizziness, focal weakness, headaches, light-headedness, loss of balance and weakness.   Psychiatric/Behavioral: Negative for depression. The patient is not nervous/anxious.      Objective:     Vital Signs (Most Recent):  Temp: 98.6 °F (37 °C) (01/15/22 1224)  Pulse: 72 (01/15/22 1224)  Resp: 20 (01/15/22 1224)  BP: 133/67 (01/15/22 1224)  SpO2: 96 % (01/15/22 1224) Vital Signs (24h Range):  Temp:  [95.8 °F (35.4 °C)-98.6 °F (37 °C)] 98.6 °F (37 °C)  Pulse:  [53-73] 72  Resp:  [7-20] 20  SpO2:  [83 %-96 %] 96 %  BP: (116-165)/(61-80) 133/67     Weight: 103 kg (227 lb 1.2 oz)  Body mass index is 30.8 kg/m².    SpO2: 96 %       No intake or output data in the 24 hours ending 01/15/22 1347    Lines/Drains/Airways     Peripheral Intravenous Line                 Peripheral IV - Single Lumen 01/14/22 0630 20 G Left Antecubital 1 day                Physical Exam  Constitutional:       Appearance: He is well-developed and well-nourished.   HENT:      Head: Normocephalic and atraumatic.   Neck:      Vascular: No JVD.   Cardiovascular:      Rate and Rhythm: Normal rate and regular rhythm.      Pulses:           Carotid pulses are 2+ on the right side and 2+ on the left side.       Radial pulses are 2+ on the right side and 2+ on the left side.        Femoral pulses are 2+ on the right side and 2+ on the left side.       Dorsalis pedis pulses are 2+ on the right side and 2+ on the left side.        Posterior tibial pulses are 2+ on the right side and 2+  on the left side.      Heart sounds: Normal heart sounds.   Pulmonary:      Effort: Pulmonary effort is normal.      Breath sounds: Normal breath sounds.   Abdominal:      General: Bowel sounds are normal.      Palpations: Abdomen is soft.   Musculoskeletal:         General: No edema.      Cervical back: Neck supple.   Skin:     General: Skin is warm and dry.   Neurological:      Mental Status: He is alert and oriented to person, place, and time.   Psychiatric:         Mood and Affect: Mood and affect normal.         Behavior: Behavior normal.         Thought Content: Thought content normal.         Significant Labs:   ABG: No results for input(s): PH, PCO2, HCO3, POCSATURATED, BE in the last 48 hours., BMP:   Recent Labs   Lab 01/14/22  0633 01/15/22  0034   * 119*    139   K 4.4 4.0    106   CO2 25 24   BUN 23 25*   CREATININE 1.0 0.8   CALCIUM 9.1 8.9   MG  --  1.8   , CMP   Recent Labs   Lab 01/14/22  0633 01/15/22  0034    139   K 4.4 4.0    106   CO2 25 24   * 119*   BUN 23 25*   CREATININE 1.0 0.8   CALCIUM 9.1 8.9   PROT 6.8 6.9   ALBUMIN 3.7 3.5   BILITOT 0.3 0.3   ALKPHOS 59 60   AST 20 44*   ALT 33 36   ANIONGAP 12 9   ESTGFRAFRICA >60.0 >60   EGFRNONAA >60.0 >60   , CBC   Recent Labs   Lab 01/14/22  0633 01/14/22  0633 01/15/22  0034   WBC 7.36  --  8.95   HGB 14.2  --  14.1   HCT 42.9   < > 43.3     --  203    < > = values in this interval not displayed.   , INR   Recent Labs   Lab 01/14/22  0633 01/15/22  0034   INR 1.0 1.0   , Lipid Panel   Recent Labs   Lab 01/15/22  0034   CHOL 167   HDL 40   LDLCALC 94.2   TRIG 164*   CHOLHDL 24.0    and Troponin   Recent Labs   Lab 01/15/22  0034 01/15/22  0626 01/15/22  1202   TROPONINI 5.634*  5.634* 5.127* 4.919*       Assessment and Plan:     Active Diagnoses:    Diagnosis Date Noted POA    PRINCIPAL PROBLEM:  NSTEMI (non-ST elevated myocardial infarction) [I21.4] 01/14/2022 Yes    Coronary artery disease  involving native coronary artery [I25.10] 01/14/2022 Yes     Chronic    HTN (hypertension) [I10] 01/14/2022 Yes    Type 2 diabetes mellitus, without long-term current use of insulin [E11.9] 01/14/2022 Yes    Depression [F32.A] 01/14/2022 Yes    PTSD (post-traumatic stress disorder) [F43.10] 01/14/2022 Yes    Sleep apnea [G47.30] 01/14/2022 Yes     Chronic      Problems Resolved During this Admission:    Diagnosis Date Noted Date Resolved POA    ACS (acute coronary syndrome) [I24.9] 01/15/2022 01/15/2022 Yes     NSTEMI  -ACS protocol   -trend trop to peak  -Plan for [facility] Monday vs Tuesday    Afib  -currently in SR  -s/p RFA    HTN  -stable    HLD  -statin    CAD   -per above    VTE Risk Mitigation (From admission, onward)         Ordered     enoxaparin injection 100 mg  Every 12 hours (non-standard times)         01/15/22 1127     IP VTE HIGH RISK PATIENT  Once         01/15/22 0015     Place sequential compression device  Until discontinued         01/15/22 0015                Thank you for your consult. I will follow-up with patient. Please contact us if you have any additional questions.    Rivas Conner MD  Cardiology   [place] - Telemetry

## 2022-01-15 NOTE — SUBJECTIVE & OBJECTIVE
Interval History: Seen at the bedside--no distress noted. Complains of chest soreness. Awaiting cardiology eval.     Review of Systems   Constitutional: Negative for diaphoresis and fatigue.   HENT: Negative for trouble swallowing.    Eyes: Negative for visual disturbance.   Respiratory: Negative for cough and shortness of breath.    Cardiovascular: Positive for chest pain.   Gastrointestinal: Negative for diarrhea, nausea and vomiting.   Genitourinary: Negative for difficulty urinating.   Musculoskeletal: Negative for gait problem and myalgias.   Neurological: Positive for weakness.   Hematological: Does not bruise/bleed easily.   Psychiatric/Behavioral: Negative for confusion.     Objective:     Vital Signs (Most Recent):  Temp: 98.6 °F (37 °C) (01/15/22 1224)  Pulse: 72 (01/15/22 1224)  Resp: 20 (01/15/22 1224)  BP: 133/67 (01/15/22 1224)  SpO2: 96 % (01/15/22 1224) Vital Signs (24h Range):  Temp:  [95.8 °F (35.4 °C)-98.6 °F (37 °C)] 98.6 °F (37 °C)  Pulse:  [53-73] 72  Resp:  [7-20] 20  SpO2:  [83 %-96 %] 96 %  BP: (116-165)/(61-80) 133/67     Weight: 103 kg (227 lb 1.2 oz)  Body mass index is 30.8 kg/m².  No intake or output data in the 24 hours ending 01/15/22 1334   Physical Exam  Vitals and nursing note reviewed.   Constitutional:       Appearance: He is obese.   HENT:      Head: Normocephalic and atraumatic.      Mouth/Throat:      Mouth: Mucous membranes are moist.   Eyes:      Extraocular Movements: Extraocular movements intact.      Conjunctiva/sclera: Conjunctivae normal.   Cardiovascular:      Rate and Rhythm: Normal rate and regular rhythm.      Pulses: Normal pulses.   Pulmonary:      Effort: Pulmonary effort is normal. No respiratory distress.   Abdominal:      General: There is no distension.      Tenderness: There is no abdominal tenderness.   Musculoskeletal:         General: Normal range of motion.      Cervical back: Normal range of motion.   Skin:     General: Skin is warm and dry.       Capillary Refill: Capillary refill takes 2 to 3 seconds.   Neurological:      Mental Status: He is alert and oriented to person, place, and time.   Psychiatric:         Mood and Affect: Mood normal.         Significant Labs: All pertinent labs within the past 24 hours have been reviewed.    Significant Imaging: I have reviewed all pertinent imaging results/findings within the past 24 hours.

## 2022-01-15 NOTE — ASSESSMENT & PLAN NOTE
No results found for: HGBA1C  -A1C pending, SSI, accuchecks Q6 while NPO, diabetic diet once allowed to eat

## 2022-01-15 NOTE — ASSESSMENT & PLAN NOTE
Hemoglobin A1C   Date Value Ref Range Status   01/15/2022 7.6 (H) 4.0 - 5.6 % Final     Comment:     ADA Screening Guidelines:  5.7-6.4%  Consistent with prediabetes  >or=6.5%  Consistent with diabetes    High levels of fetal hemoglobin interfere with the HbA1C  assay. Heterozygous hemoglobin variants (HbS, HgC, etc)do  not significantly interfere with this assay.   However, presence of multiple variants may affect accuracy.       -A1C pending, SSI, accuchecks Q6 while NPO, diabetic diet once allowed to eat

## 2022-01-15 NOTE — PROVIDER TRANSFER
Outside Transfer Acceptance Note / Regional Referral Center    Referring facility: [facility]   Referring provider: FREDI HERRERA  Accepting facility: [facility]  Accepting provider: JOSE NANCE  Admitting provider: PONCHO LAN  Reason for transfer: Higher Level of Care   Transfer diagnosis: NSTEMI  Transfer specialty requested: Cardiology  Transfer specialty notified: yes  Transfer level: NUMBER 1-5: 2  Bed type requested: telemetry  Isolation status: No active isolations   Admission class or status: IP- Inpatient      Narrative     76-year-old m with PMH CAD, HTN DM2 presented to [facility] with substernal CP which started at 0430 this morning.  Patient took some nitro and aspirin WO improvement.     Per Care Everywhere patient had a myocardial infarction in the 1980s treated medically, PCI proximal LAD in 1995, and coronary stents in distal circumflex in 2005    On arrival CP 6/10.  ROS otherwise neg.  VS (0627 h) HR 49, /67, RR 18, SpO2 96% (RA).  Labs:  CBC and CMP unremarkable except for Glu 235, troponin 0.006 > 0.065, BNP 22, COVID neg. EKG sinus bradycardia (48) with no acute changes, CXR clear. Patient given aspirin (324) and morphine.  Over the next few h the pain improved (6 > 2).  Patient resting comfortably.  Transfer requested for higher level of care (cardiology).  Transfer diagnosis acute coronary syndrome    Instructions      [facility]  Admit to Hospital Medicine  Upon patient arrival to floor, please contact Hospital Medicine on call.

## 2022-01-15 NOTE — ASSESSMENT & PLAN NOTE
Transferred from [place] for ACS work up  History of MI in 80s treated medically   Troponin 0.006-0.065-5.634  Received  mg and morphine PTA with some pain relief, EKG with Sinus bradycardia with 1st degree A-V block. Cannot rule out Anterior infarct  No current complaints of CP or SOB  With last troponin level increase repeat EKG with: Sinus rhythm with 1st degree A-V block with occasional Premature ventricular complexes. Possible Anterior infarct  -trend troponin  -trend EKG  -NPO  -ECHO pending  -appreciate cardiology  -loaded with Plavix  -full dose Lovenox

## 2022-01-15 NOTE — H&P (VIEW-ONLY)
[facility] - Telemetry  Cardiology  Consult Note    Patient Name: Hilario Vivar  MRN: [id number]  Admission Date: 1/14/2022  Hospital Length of Stay: 1 days  Code Status: Full Code   Attending Provider: Patsy Burkett*   Consulting Provider: Rivas Conner MD  Primary Care Physician: Arthur Cavazos MD  Principal Problem:NSTEMI (non-ST elevated myocardial infarction)    Patient information was obtained from patient and ER records.     Consults  Subjective:     Chief Complaint:  NSTEMI     HPI:   77 y/o male with hx of CAD s/p PCI  (AMI 1980's, proximal LAD PCI 1995, distal circ stents 2005), PAfib s/p RFA, HTN, DM who presented with CP and diagnosed with NSTEMI. Last trop 5.127. BP stable and no recurrent CP.    Past Medical History:   Diagnosis Date    AF (paroxysmal atrial fibrillation)     Atrial flutter     Coronary artery disease     Diabetes mellitus     Hypertension     Myocardial infarction     Osteoarthritis        History reviewed. No pertinent surgical history.    Review of patient's allergies indicates:  No Known Allergies    No current facility-administered medications on file prior to encounter.     Current Outpatient Medications on File Prior to Encounter   Medication Sig    acetaminophen (TYLENOL) 500 MG tablet Take 500 mg by mouth every 4 (four) hours as needed.    aspirin (ECOTRIN) 81 MG EC tablet Take 81 mg by mouth once daily.    atorvastatin (LIPITOR) 40 MG tablet Take 40 mg by mouth once daily.    canagliflozin (INVOKANA) 300 mg Tab tablet Take 1 tablet by mouth once daily.    cholecalciferol, vitamin D3, (VITAMIN D3) 50 mcg (2,000 unit) Cap Take by mouth.    coenzyme Q10 200 mg capsule Take 200 mg by mouth.    docosahexaenoic acid-epa 120-180 mg Cap Take 1,000 mg by mouth.    doxepin (SINEQUAN) 10 MG capsule Take 10 mg by mouth.    glipiZIDE (GLUCOTROL) 10 MG tablet Take 10 mg by mouth 2 (two) times daily before meals.    INV doxazosin (CARDURA) 8 MG Tab Take 4 mg  by mouth once daily. FOR INVESTIGATIONAL USE ONLY    INVOKANA 300 mg Tab tablet     lisinopriL 10 MG tablet Take 10 mg by mouth once daily.    magnesium oxide (MAG-OX) 400 mg (241.3 mg magnesium) tablet Take 400 mg by mouth once daily.    meloxicam (MOBIC) 15 MG tablet Take 15 mg by mouth once daily.    metFORMIN (GLUCOPHAGE) 1000 MG tablet Take 1,000 mg by mouth 2 (two) times daily with meals.    metoprolol tartrate (LOPRESSOR) 25 MG tablet Take 25 mg by mouth.    mirtazapine (REMERON) 30 MG tablet Take 30 mg by mouth.    multivitamin (MULTIPLE VITAMINS DAILY ORAL) Take by mouth.    omega-3 fatty acids 1,000 mg Cap Take 2 g by mouth.    omeprazole (PRILOSEC) 20 MG capsule Take 1 capsule by mouth once daily.    prazosin (MINIPRESS) 5 MG capsule Take 5 mg by mouth every evening.    tamsulosin HCl (FLOMAX ORAL) Take by mouth.    venlafaxine (EFFEXOR) 100 MG Tab Take by mouth 2 (two) times daily.    VITAMIN B COMPLEX ORAL Take by mouth.    vitamin D (VITAMIN D3) 1000 units Tab Take by mouth.    [DISCONTINUED] coenzyme Q10 100 mg capsule Take 200 mg by mouth.    [DISCONTINUED] omega-3 fatty acids/fish oil (FISH OIL-OMEGA-3 FATTY ACIDS) 300-1,000 mg capsule Take by mouth once daily.    [DISCONTINUED] amoxicillin (AMOXIL) 500 MG Tab TAKE 1 TABLET BY MOUTH EVERY 8 HOURS WITH FOOD UNTIL GONE    [DISCONTINUED] rosuvastatin (CRESTOR) 20 MG tablet Take 20 mg by mouth.     Family History    None       Tobacco Use    Smoking status: Not on file    Smokeless tobacco: Not on file   Substance and Sexual Activity    Alcohol use: Not on file    Drug use: Not on file    Sexual activity: Not on file     Review of Systems   Constitutional: Negative for malaise/fatigue.   HENT: Negative for congestion.    Eyes: Negative for blurred vision.   Cardiovascular: Negative for chest pain, claudication, cyanosis, dyspnea on exertion, irregular heartbeat, leg swelling, near-syncope, orthopnea, palpitations, paroxysmal  nocturnal dyspnea and syncope.   Respiratory: Negative for shortness of breath.    Endocrine: Negative for polyuria.   Hematologic/Lymphatic: Negative for bleeding problem.   Skin: Negative for itching and rash.   Musculoskeletal: Negative for joint swelling, muscle cramps and muscle weakness.   Gastrointestinal: Negative for abdominal pain, hematemesis, hematochezia, melena, nausea and vomiting.   Genitourinary: Negative for dysuria and hematuria.   Neurological: Negative for dizziness, focal weakness, headaches, light-headedness, loss of balance and weakness.   Psychiatric/Behavioral: Negative for depression. The patient is not nervous/anxious.      Objective:     Vital Signs (Most Recent):  Temp: 98.6 °F (37 °C) (01/15/22 1224)  Pulse: 72 (01/15/22 1224)  Resp: 20 (01/15/22 1224)  BP: 133/67 (01/15/22 1224)  SpO2: 96 % (01/15/22 1224) Vital Signs (24h Range):  Temp:  [95.8 °F (35.4 °C)-98.6 °F (37 °C)] 98.6 °F (37 °C)  Pulse:  [53-73] 72  Resp:  [7-20] 20  SpO2:  [83 %-96 %] 96 %  BP: (116-165)/(61-80) 133/67     Weight: 103 kg (227 lb 1.2 oz)  Body mass index is 30.8 kg/m².    SpO2: 96 %       No intake or output data in the 24 hours ending 01/15/22 1347    Lines/Drains/Airways     Peripheral Intravenous Line                 Peripheral IV - Single Lumen 01/14/22 0630 20 G Left Antecubital 1 day                Physical Exam  Constitutional:       Appearance: He is well-developed and well-nourished.   HENT:      Head: Normocephalic and atraumatic.   Neck:      Vascular: No JVD.   Cardiovascular:      Rate and Rhythm: Normal rate and regular rhythm.      Pulses:           Carotid pulses are 2+ on the right side and 2+ on the left side.       Radial pulses are 2+ on the right side and 2+ on the left side.        Femoral pulses are 2+ on the right side and 2+ on the left side.       Dorsalis pedis pulses are 2+ on the right side and 2+ on the left side.        Posterior tibial pulses are 2+ on the right side and 2+  on the left side.      Heart sounds: Normal heart sounds.   Pulmonary:      Effort: Pulmonary effort is normal.      Breath sounds: Normal breath sounds.   Abdominal:      General: Bowel sounds are normal.      Palpations: Abdomen is soft.   Musculoskeletal:         General: No edema.      Cervical back: Neck supple.   Skin:     General: Skin is warm and dry.   Neurological:      Mental Status: He is alert and oriented to person, place, and time.   Psychiatric:         Mood and Affect: Mood and affect normal.         Behavior: Behavior normal.         Thought Content: Thought content normal.         Significant Labs:   ABG: No results for input(s): PH, PCO2, HCO3, POCSATURATED, BE in the last 48 hours., BMP:   Recent Labs   Lab 01/14/22  0633 01/15/22  0034   * 119*    139   K 4.4 4.0    106   CO2 25 24   BUN 23 25*   CREATININE 1.0 0.8   CALCIUM 9.1 8.9   MG  --  1.8   , CMP   Recent Labs   Lab 01/14/22  0633 01/15/22  0034    139   K 4.4 4.0    106   CO2 25 24   * 119*   BUN 23 25*   CREATININE 1.0 0.8   CALCIUM 9.1 8.9   PROT 6.8 6.9   ALBUMIN 3.7 3.5   BILITOT 0.3 0.3   ALKPHOS 59 60   AST 20 44*   ALT 33 36   ANIONGAP 12 9   ESTGFRAFRICA >60.0 >60   EGFRNONAA >60.0 >60   , CBC   Recent Labs   Lab 01/14/22  0633 01/14/22  0633 01/15/22  0034   WBC 7.36  --  8.95   HGB 14.2  --  14.1   HCT 42.9   < > 43.3     --  203    < > = values in this interval not displayed.   , INR   Recent Labs   Lab 01/14/22  0633 01/15/22  0034   INR 1.0 1.0   , Lipid Panel   Recent Labs   Lab 01/15/22  0034   CHOL 167   HDL 40   LDLCALC 94.2   TRIG 164*   CHOLHDL 24.0    and Troponin   Recent Labs   Lab 01/15/22  0034 01/15/22  0626 01/15/22  1202   TROPONINI 5.634*  5.634* 5.127* 4.919*       Assessment and Plan:     Active Diagnoses:    Diagnosis Date Noted POA    PRINCIPAL PROBLEM:  NSTEMI (non-ST elevated myocardial infarction) [I21.4] 01/14/2022 Yes    Coronary artery disease  involving native coronary artery [I25.10] 01/14/2022 Yes     Chronic    HTN (hypertension) [I10] 01/14/2022 Yes    Type 2 diabetes mellitus, without long-term current use of insulin [E11.9] 01/14/2022 Yes    Depression [F32.A] 01/14/2022 Yes    PTSD (post-traumatic stress disorder) [F43.10] 01/14/2022 Yes    Sleep apnea [G47.30] 01/14/2022 Yes     Chronic      Problems Resolved During this Admission:    Diagnosis Date Noted Date Resolved POA    ACS (acute coronary syndrome) [I24.9] 01/15/2022 01/15/2022 Yes     NSTEMI  -ACS protocol   -trend trop to peak  -Plan for [facility] Monday vs Tuesday    Afib  -currently in SR  -s/p RFA    HTN  -stable    HLD  -statin    CAD   -per above    VTE Risk Mitigation (From admission, onward)         Ordered     enoxaparin injection 100 mg  Every 12 hours (non-standard times)         01/15/22 1127     IP VTE HIGH RISK PATIENT  Once         01/15/22 0015     Place sequential compression device  Until discontinued         01/15/22 0015                Thank you for your consult. I will follow-up with patient. Please contact us if you have any additional questions.    Rivas Conner MD  Cardiology   [place] - Telemetry

## 2022-01-15 NOTE — ASSESSMENT & PLAN NOTE
Transferred from [place] for ACS work up  History of MI in 80s treated medically   Troponin 0.006-0.065-5.634  Received  mg and morphine PTA with some pain relief, EKG with Sinus bradycardia with 1st degree A-V block. Cannot rule out Anterior infarct  No current complaints of CP or SOB  With last troponin level increase repeat EKG with: Sinus rhythm with 1st degree A-V block with occasional Premature ventricular complexes. Possible Anterior infarct  -trend troponin  -trend EKG  -NPO  -ECHO pending  -appreciate cardiology Dr. Berry with no added changes at this time

## 2022-01-15 NOTE — H&P
[facility] Medicine  History & Physical    Patient Name: Hilario Vivar  MRN: [id number]  Patient Class: IP- Inpatient  Admission Date: 1/14/2022  Attending Physician: Patsy Burkett*   Primary Care Provider: Arthur Cavazos MD         Patient information was obtained from patient, past medical records and ER records.     Subjective:     Principal Problem:NSTEMI (non-ST elevated myocardial infarction)    Chief Complaint: No chief complaint on file.       HPI: Hilario Vivar is a 76 year old male with a PMHx of Hilario Vivar is a 76 y.o. male patient with a past medical history of CAD (AMI 1980's, proximal LAD PCI 1995, distal circ stents 2005), PAF/atrial flutter s/p ablation, HTN, HLD, Osteoarthritis and DM who presented to [place] ED ~ 9:30 am on 1/14/21 with complaints of chest pain, unrelieved by nitroglycerin SL X 3 and 4 81 mg Aspirin. The chest pain started about 4:30 am. Patient reports taking nitro and aspiring but with no relief of pain. Denies pain radiation reports substernal pain. Pain was reported 6/10 then decreased to 2/10 after morphine and  mg. While there he was noted to have VS (0627 h) HR 49, /67, RR 18, SpO2 96% (RA).  Labs:  CBC and CMP unremarkable except for Glu 235, troponin 0.006 > 0.065, BNP 22, COVID neg. EKG sinus bradycardia (48) with no acute changes, CXR clear. Patient given aspirin (324) and morphine. Over the next few h the pain improved (6 > 2).  Patient resting comfortably.  Transfer requested for higher level of care (cardiology).                       Past Medical History:   Diagnosis Date    AF (paroxysmal atrial fibrillation)     Atrial flutter     Coronary artery disease     Diabetes mellitus     Hypertension     Myocardial infarction     Osteoarthritis        No past surgical history on file.    Review of patient's allergies indicates:  No Known Allergies    Current Facility-Administered Medications on  File Prior to Encounter   Medication    [COMPLETED] aspirin chewable tablet 324 mg    [COMPLETED] morphine injection 2 mg    [COMPLETED] morphine injection 4 mg    [COMPLETED] ondansetron injection 4 mg     Current Outpatient Medications on File Prior to Encounter   Medication Sig    acetaminophen (TYLENOL) 500 MG tablet Take 500 mg by mouth every 4 (four) hours as needed.    aspirin (ECOTRIN) 81 MG EC tablet Take 81 mg by mouth once daily.    atorvastatin (LIPITOR) 40 MG tablet Take 40 mg by mouth once daily.    canagliflozin (INVOKANA) 300 mg Tab tablet Take 1 tablet by mouth once daily.    cholecalciferol, vitamin D3, (VITAMIN D3) 50 mcg (2,000 unit) Cap Take by mouth.    coenzyme Q10 100 mg capsule Take 200 mg by mouth.    coenzyme Q10 200 mg capsule Take 200 mg by mouth.    docosahexaenoic acid-epa 120-180 mg Cap Take 1,000 mg by mouth.    doxepin (SINEQUAN) 10 MG capsule Take 10 mg by mouth.    glipiZIDE (GLUCOTROL) 10 MG tablet Take 10 mg by mouth 2 (two) times daily before meals.    INV doxazosin (CARDURA) 8 MG Tab Take 4 mg by mouth once daily. FOR INVESTIGATIONAL USE ONLY    lisinopriL 10 MG tablet Take 10 mg by mouth once daily.    magnesium oxide (MAG-OX) 400 mg (241.3 mg magnesium) tablet Take 400 mg by mouth once daily.    meloxicam (MOBIC) 15 MG tablet Take 15 mg by mouth once daily.    metFORMIN (GLUCOPHAGE) 1000 MG tablet Take 1,000 mg by mouth 2 (two) times daily with meals.    metoprolol tartrate (LOPRESSOR) 25 MG tablet Take 25 mg by mouth.    mirtazapine (REMERON) 30 MG tablet Take 30 mg by mouth.    omega-3 fatty acids 1,000 mg Cap Take 2 g by mouth.    omega-3 fatty acids/fish oil (FISH OIL-OMEGA-3 FATTY ACIDS) 300-1,000 mg capsule Take by mouth once daily.    omeprazole (PRILOSEC) 20 MG capsule Take 1 capsule by mouth once daily.    tamsulosin HCl (FLOMAX ORAL) Take by mouth.    venlafaxine (EFFEXOR) 100 MG Tab Take by mouth 2 (two) times daily.    VITAMIN B COMPLEX  ORAL Take by mouth.    vitamin D (VITAMIN D3) 1000 units Tab Take by mouth.    amoxicillin (AMOXIL) 500 MG Tab TAKE 1 TABLET BY MOUTH EVERY 8 HOURS WITH FOOD UNTIL GONE    INVOKANA 300 mg Tab tablet     multivitamin (MULTIPLE VITAMINS DAILY ORAL) Take by mouth.    prazosin (MINIPRESS) 5 MG capsule Take 5 mg by mouth every evening.    rosuvastatin (CRESTOR) 20 MG tablet Take 20 mg by mouth.     Family History    None       Tobacco Use    Smoking status: Not on file    Smokeless tobacco: Not on file   Substance and Sexual Activity    Alcohol use: Not on file    Drug use: Not on file    Sexual activity: Not on file     Review of Systems   Constitutional: Negative for activity change, appetite change, chills, diaphoresis, fatigue, fever and unexpected weight change.   HENT: Negative for sore throat and trouble swallowing.    Eyes: Negative for visual disturbance.   Respiratory: Negative for cough, shortness of breath and wheezing.    Cardiovascular: Negative for chest pain, palpitations and leg swelling.   Gastrointestinal: Negative for abdominal distention, abdominal pain, diarrhea, nausea and vomiting.   Genitourinary: Negative for difficulty urinating.   Musculoskeletal: Negative for myalgias.   Skin: Negative for color change.   Neurological: Negative for dizziness, weakness and headaches.     Objective:     Vital Signs (Most Recent):  Temp: (!) 95.8 °F (35.4 °C) (01/15/22 0030)  Pulse: (!) 53 (01/15/22 0030)  Resp: 18 (01/15/22 0030)  BP: (!) 162/78 (01/15/22 0030)  SpO2: 95 % (01/15/22 0030) Vital Signs (24h Range):  Temp:  [95.8 °F (35.4 °C)-98 °F (36.7 °C)] 95.8 °F (35.4 °C)  Pulse:  [48-61] 53  Resp:  [7-20] 18  SpO2:  [83 %-97 %] 95 %  BP: (117-165)/(60-80) 162/78     Weight: 103 kg (227 lb 1.2 oz)  Body mass index is 30.8 kg/m².    Physical Exam  Vitals and nursing note reviewed.   Constitutional:       General: He is not in acute distress.     Appearance: Normal appearance. He is not  ill-appearing.   HENT:      Head: Normocephalic and atraumatic.      Mouth/Throat:      Mouth: Mucous membranes are moist.   Eyes:      Extraocular Movements: Extraocular movements intact.      Pupils: Pupils are equal, round, and reactive to light.   Cardiovascular:      Rate and Rhythm: Regular rhythm. Bradycardia present.      Pulses: Normal pulses.   Pulmonary:      Effort: Pulmonary effort is normal. No respiratory distress.      Breath sounds: Normal breath sounds.   Abdominal:      General: Bowel sounds are normal. There is no distension.      Palpations: Abdomen is soft.      Tenderness: There is no abdominal tenderness.   Musculoskeletal:         General: No swelling. Normal range of motion.      Cervical back: Normal range of motion.   Skin:     General: Skin is warm and dry.      Capillary Refill: Capillary refill takes 2 to 3 seconds.   Neurological:      General: No focal deficit present.      Mental Status: He is alert and oriented to person, place, and time. Mental status is at baseline.   Psychiatric:         Mood and Affect: Mood normal.         Thought Content: Thought content normal.         Judgment: Judgment normal.           CRANIAL NERVES     CN III, IV, VI   Pupils are equal, round, and reactive to light.       Significant Labs:   All pertinent labs within the past 24 hours have been reviewed.  Recent Lab Results       01/15/22  0034   01/14/22  1346   01/14/22  1044   01/14/22  0633        Albumin 3.5       3.7       Alkaline Phosphatase 60       59       ALT 36       33       Anion Gap 9       12       aPTT 28.3  Comment: aPTT therapeutic range = 39-69 seconds             AST 44       20       Baso # 0.03       0.03       Basophil % 0.3       0.4       BILIRUBIN TOTAL 0.3  Comment: For infants and newborns, interpretation of results should be based  on gestational age, weight and in agreement with clinical  observations.    Premature Infant recommended reference ranges:  Up to 24  hours.............<8.0 mg/dL  Up to 48 hours............<12.0 mg/dL  3-5 days..................<15.0 mg/dL  6-29 days.................<15.0 mg/dL         0.3  Comment: For infants and newborns, interpretation of results should be based  on gestational age, weight and in agreement with clinical  observations.    Premature Infant recommended reference ranges:  Up to 24 hours.............<8.0 mg/dL  Up to 48 hours............<12.0 mg/dL  3-5 days..................<15.0 mg/dL  6-29 days.................<15.0 mg/dL         BNP       22  Comment: Values of less than 100 pg/ml are consistent with non-CHF populations.       BUN 25       23       Calcium 8.9       9.1       Chloride 106       103       Cholesterol 167  Comment: The National Cholesterol Education Program (NCEP) has set the  following guidelines (reference ranges) for Cholesterol:  Optimal.....................<200 mg/dL  Borderline High.............200-239 mg/dL  High........................> or = 240 mg/dL               CO2 24       25       Creatinine 0.8       1.0       Differential Method Automated       Automated       eGFR if  >60       >60.0       eGFR if non  >60  Comment: Calculation used to obtain the estimated glomerular filtration  rate (eGFR) is the CKD-EPI equation.          >60.0  Comment: Calculation used to obtain the estimated glomerular filtration  rate (eGFR) is the CKD-EPI equation.          Eos # 0.1       0.1       Eosinophil % 1.1       1.5       Glucose 119       235       Gran # (ANC) 5.3       4.4       Gran % 59.6       59.2       HDL 40  Comment: The National Cholesterol Education Program (NCEP) has set the  following guidelines (reference values) for HDL Cholesterol:  Low...............<40 mg/dL  Optimal...........>60 mg/dL               HDL/Cholesterol Ratio 24.0             Hematocrit 43.3       42.9       Hemoglobin 14.1       14.2       Immature Grans (Abs) 0.02  Comment: Mild elevation in  immature granulocytes is non specific and   can be seen in a variety of conditions including stress response,   acute inflammation, trauma and pregnancy. Correlation with other   laboratory and clinical findings is essential.         0.03  Comment: Mild elevation in immature granulocytes is non specific and   can be seen in a variety of conditions including stress response,   acute inflammation, trauma and pregnancy. Correlation with other   laboratory and clinical findings is essential.         Immature Granulocytes 0.2       0.4       INR 1.0  Comment: Coumadin Therapy:  2.0 - 3.0 for INR for all indicators except mechanical heart valves  and antiphospholipid syndromes which should use 2.5 - 3.5.         1.0  Comment: Coumadin Therapy:  2.0 - 3.0 for INR for all indicators except mechanical heart valves  and antiphospholipid syndromes which should use 2.5 - 3.5.         LDL Cholesterol External 94.2  Comment: The National Cholesterol Education Program (NCEP) has set the  following guidelines (reference values) for LDL Cholesterol:  Optimal.......................<130 mg/dL  Borderline High...............130-159 mg/dL  High..........................160-189 mg/dL  Very High.....................>190 mg/dL               Lymph # 2.8       2.3       Lymph % 31.6       31.8       Magnesium 1.8             MCH 28.7       29.4       MCHC 32.6       33.1       MCV 88       89       Mono # 0.6       0.5       Mono % 7.2       6.7       MPV 10.2       10.0       Non-HDL Cholesterol 127  Comment: Risk category and Non-HDL cholesterol goals:  Coronary heart disease (CHD)or equivalent (10-year risk of CHD >20%):  Non-HDL cholesterol goal     <130 mg/dL  Two or more CHD risk factors and 10-year risk of CHD <= 20%:  Non-HDL cholesterol goal     <160 mg/dL  0 to 1 CHD risk factor:  Non-HDL cholesterol goal     <190 mg/dL               nRBC 0       0       Phosphorus 3.3             Platelets 203       171       Potassium 4.0        4.4       PROTEIN TOTAL 6.9       6.8       Protime 10.3       10.4       RBC 4.91       4.83       RDW 13.0       12.9       SARS-CoV-2 RNA, Amplification, Qual   Negative  Comment: This test utilizes isothermal nucleic acid amplification   technology to detect the SARS-CoV-2 RdRp nucleic acid segment.   The analytical sensitivity (limit of detection) is 125 genome   equivalents/mL.     A POSITIVE result implies infection with the SARS-CoV-2 virus;  the patient is presumed to be contagious.    A NEGATIVE result means that SARS-CoV-2 nucleic acids are not  present above the limit of detection. A NEGATIVE result should be   treated as presumptive. It does not rule out the possibility of   COVID-19 and should not be the sole basis for treatment decisions.   If COVID-19 is strongly suspected based on clinical and exposure   history, re-testing using an alternate molecular assay should be   considered.       This test is only for use under the Food and Drug   Administration s Emergency Use Authorization (EUA).   Commercial kits are provided by [employer].   Performance characteristics of the EUA have been independently  verified by [facility] Department of  Pathology and Laboratory Medicine.   _________________________________________________________________  The ID NOW COVID-19 Letter of Authorization, along with the   authorized Fact Sheet for Healthcare Providers, the authorized Fact  Sheet for Patients, and authorized labeling are available on the FDA   website:  www.fda.gov/MedicalDevices/Safety/EmergencySituations/cod000924.htm             Sodium 139       140       Total Cholesterol/HDL Ratio 4.2             Triglycerides 164  Comment: The National Cholesterol Education Program (NCEP) has set the  following guidelines (reference values) for triglycerides:  Normal......................<150 mg/dL  Borderline High.............150-199 mg/dL  High........................200-499 mg/dL                Troponin I 5.634  Comment: The reference interval for Troponin I represents the 99th percentile   cutoff   for our facility and is consistent with 3rd generation assay   performance.       0.065  Comment: The reference interval for Troponin I represents the 99th percentile   cutoff   for our facility and is consistent with 3rd generation assay   performance.     0.006  Comment: The reference interval for Troponin I represents the 99th percentile   cutoff   for our facility and is consistent with 3rd generation assay   performance.          5.634  Comment: The reference interval for Troponin I represents the 99th percentile   cutoff   for our facility and is consistent with 3rd generation assay   performance.               WBC 8.95       7.36             Significant Imaging: I have reviewed all pertinent imaging results/findings within the past 24 hours.    Assessment/Plan:     * NSTEMI (non-ST elevated myocardial infarction)  Transferred from [facility] for ACS work up  History of MI in 80s treated medically   Troponin 0.006-0.065-5.634  Received  mg and morphine PTA with some pain relief, EKG with Sinus bradycardia with 1st degree A-V block. Cannot rule out Anterior infarct  No current complaints of CP or SOB  With last troponin level increase repeat EKG with: Sinus rhythm with 1st degree A-V block with occasional Premature ventricular complexes. Possible Anterior infarct  -trend troponin  -trend EKG  -NPO  -ECHO pending  -appreciate cardiology Dr. Berry with no added changes at this time        Sleep apnea  CPAP QHS      PTSD (post-traumatic stress disorder)  Depression  Takes prazosin, Effexor, Remeron, at home  Will resume      Type 2 diabetes mellitus, without long-term current use of insulin  No results found for: HGBA1C  -A1C pending, SSI, accuchecks Q6 while NPO, diabetic diet once allowed to eat      HTN (hypertension)  Hypertensive on admission  Takes lisinopril, metoprolol at home-resume and  monitor      Coronary artery disease involving native coronary artery  PCI proximal LAD in 1995, Coronary stents in distal circumflex 2005   Continue home ASA, coreg, statin        VTE Risk Mitigation (From admission, onward)         Ordered     enoxaparin injection 40 mg  Daily         01/15/22 0015     IP VTE HIGH RISK PATIENT  Once         01/15/22 0015     Place sequential compression device  Until discontinued         01/15/22 0015                   Krysten Morgan NP  Department of Hospital Medicine   [place] [facility]

## 2022-01-15 NOTE — SUBJECTIVE & OBJECTIVE
Past Medical History:   Diagnosis Date    AF (paroxysmal atrial fibrillation)     Atrial flutter     Coronary artery disease     Diabetes mellitus     Hypertension     Myocardial infarction     Osteoarthritis        No past surgical history on file.    Review of patient's allergies indicates:  No Known Allergies    Current Facility-Administered Medications on File Prior to Encounter   Medication    [COMPLETED] aspirin chewable tablet 324 mg    [COMPLETED] morphine injection 2 mg    [COMPLETED] morphine injection 4 mg    [COMPLETED] ondansetron injection 4 mg     Current Outpatient Medications on File Prior to Encounter   Medication Sig    acetaminophen (TYLENOL) 500 MG tablet Take 500 mg by mouth every 4 (four) hours as needed.    aspirin (ECOTRIN) 81 MG EC tablet Take 81 mg by mouth once daily.    atorvastatin (LIPITOR) 40 MG tablet Take 40 mg by mouth once daily.    canagliflozin (INVOKANA) 300 mg Tab tablet Take 1 tablet by mouth once daily.    cholecalciferol, vitamin D3, (VITAMIN D3) 50 mcg (2,000 unit) Cap Take by mouth.    coenzyme Q10 100 mg capsule Take 200 mg by mouth.    coenzyme Q10 200 mg capsule Take 200 mg by mouth.    docosahexaenoic acid-epa 120-180 mg Cap Take 1,000 mg by mouth.    doxepin (SINEQUAN) 10 MG capsule Take 10 mg by mouth.    glipiZIDE (GLUCOTROL) 10 MG tablet Take 10 mg by mouth 2 (two) times daily before meals.    INV doxazosin (CARDURA) 8 MG Tab Take 4 mg by mouth once daily. FOR INVESTIGATIONAL USE ONLY    lisinopriL 10 MG tablet Take 10 mg by mouth once daily.    magnesium oxide (MAG-OX) 400 mg (241.3 mg magnesium) tablet Take 400 mg by mouth once daily.    meloxicam (MOBIC) 15 MG tablet Take 15 mg by mouth once daily.    metFORMIN (GLUCOPHAGE) 1000 MG tablet Take 1,000 mg by mouth 2 (two) times daily with meals.    metoprolol tartrate (LOPRESSOR) 25 MG tablet Take 25 mg by mouth.    mirtazapine (REMERON) 30 MG tablet Take 30 mg by mouth.    omega-3  fatty acids 1,000 mg Cap Take 2 g by mouth.    omega-3 fatty acids/fish oil (FISH OIL-OMEGA-3 FATTY ACIDS) 300-1,000 mg capsule Take by mouth once daily.    omeprazole (PRILOSEC) 20 MG capsule Take 1 capsule by mouth once daily.    tamsulosin HCl (FLOMAX ORAL) Take by mouth.    venlafaxine (EFFEXOR) 100 MG Tab Take by mouth 2 (two) times daily.    VITAMIN B COMPLEX ORAL Take by mouth.    vitamin D (VITAMIN D3) 1000 units Tab Take by mouth.    amoxicillin (AMOXIL) 500 MG Tab TAKE 1 TABLET BY MOUTH EVERY 8 HOURS WITH FOOD UNTIL GONE    INVOKANA 300 mg Tab tablet     multivitamin (MULTIPLE VITAMINS DAILY ORAL) Take by mouth.    prazosin (MINIPRESS) 5 MG capsule Take 5 mg by mouth every evening.    rosuvastatin (CRESTOR) 20 MG tablet Take 20 mg by mouth.     Family History    None       Tobacco Use    Smoking status: Not on file    Smokeless tobacco: Not on file   Substance and Sexual Activity    Alcohol use: Not on file    Drug use: Not on file    Sexual activity: Not on file     Review of Systems   Constitutional: Negative for activity change, appetite change, chills, diaphoresis, fatigue, fever and unexpected weight change.   HENT: Negative for sore throat and trouble swallowing.    Eyes: Negative for visual disturbance.   Respiratory: Negative for cough, shortness of breath and wheezing.    Cardiovascular: Negative for chest pain, palpitations and leg swelling.   Gastrointestinal: Negative for abdominal distention, abdominal pain, diarrhea, nausea and vomiting.   Genitourinary: Negative for difficulty urinating.   Musculoskeletal: Negative for myalgias.   Skin: Negative for color change.   Neurological: Negative for dizziness, weakness and headaches.     Objective:     Vital Signs (Most Recent):  Temp: (!) 95.8 °F (35.4 °C) (01/15/22 0030)  Pulse: (!) 53 (01/15/22 0030)  Resp: 18 (01/15/22 0030)  BP: (!) 162/78 (01/15/22 0030)  SpO2: 95 % (01/15/22 0030) Vital Signs (24h Range):  Temp:  [95.8 °F  (35.4 °C)-98 °F (36.7 °C)] 95.8 °F (35.4 °C)  Pulse:  [48-61] 53  Resp:  [7-20] 18  SpO2:  [83 %-97 %] 95 %  BP: (117-165)/(60-80) 162/78     Weight: 103 kg (227 lb 1.2 oz)  Body mass index is 30.8 kg/m².    Physical Exam  Vitals and nursing note reviewed.   Constitutional:       General: He is not in acute distress.     Appearance: Normal appearance. He is not ill-appearing.   HENT:      Head: Normocephalic and atraumatic.      Mouth/Throat:      Mouth: Mucous membranes are moist.   Eyes:      Extraocular Movements: Extraocular movements intact.      Pupils: Pupils are equal, round, and reactive to light.   Cardiovascular:      Rate and Rhythm: Regular rhythm. Bradycardia present.      Pulses: Normal pulses.   Pulmonary:      Effort: Pulmonary effort is normal. No respiratory distress.      Breath sounds: Normal breath sounds.   Abdominal:      General: Bowel sounds are normal. There is no distension.      Palpations: Abdomen is soft.      Tenderness: There is no abdominal tenderness.   Musculoskeletal:         General: No swelling. Normal range of motion.      Cervical back: Normal range of motion.   Skin:     General: Skin is warm and dry.      Capillary Refill: Capillary refill takes 2 to 3 seconds.   Neurological:      General: No focal deficit present.      Mental Status: He is alert and oriented to person, place, and time. Mental status is at baseline.   Psychiatric:         Mood and Affect: Mood normal.         Thought Content: Thought content normal.         Judgment: Judgment normal.           CRANIAL NERVES     CN III, IV, VI   Pupils are equal, round, and reactive to light.       Significant Labs:   All pertinent labs within the past 24 hours have been reviewed.  Recent Lab Results       01/15/22  0034   01/14/22  1346   01/14/22  1044   01/14/22  0633        Albumin 3.5       3.7       Alkaline Phosphatase 60       59       ALT 36       33       Anion Gap 9       12       aPTT 28.3  Comment: aPTT  therapeutic range = 39-69 seconds             AST 44       20       Baso # 0.03       0.03       Basophil % 0.3       0.4       BILIRUBIN TOTAL 0.3  Comment: For infants and newborns, interpretation of results should be based  on gestational age, weight and in agreement with clinical  observations.    Premature Infant recommended reference ranges:  Up to 24 hours.............<8.0 mg/dL  Up to 48 hours............<12.0 mg/dL  3-5 days..................<15.0 mg/dL  6-29 days.................<15.0 mg/dL         0.3  Comment: For infants and newborns, interpretation of results should be based  on gestational age, weight and in agreement with clinical  observations.    Premature Infant recommended reference ranges:  Up to 24 hours.............<8.0 mg/dL  Up to 48 hours............<12.0 mg/dL  3-5 days..................<15.0 mg/dL  6-29 days.................<15.0 mg/dL         BNP       22  Comment: Values of less than 100 pg/ml are consistent with non-CHF populations.       BUN 25       23       Calcium 8.9       9.1       Chloride 106       103       Cholesterol 167  Comment: The National Cholesterol Education Program (NCEP) has set the  following guidelines (reference ranges) for Cholesterol:  Optimal.....................<200 mg/dL  Borderline High.............200-239 mg/dL  High........................> or = 240 mg/dL               CO2 24       25       Creatinine 0.8       1.0       Differential Method Automated       Automated       eGFR if  >60       >60.0       eGFR if non  >60  Comment: Calculation used to obtain the estimated glomerular filtration  rate (eGFR) is the CKD-EPI equation.          >60.0  Comment: Calculation used to obtain the estimated glomerular filtration  rate (eGFR) is the CKD-EPI equation.          Eos # 0.1       0.1       Eosinophil % 1.1       1.5       Glucose 119       235       Gran # (ANC) 5.3       4.4       Gran % 59.6       59.2       HDL 40  Comment:  The National Cholesterol Education Program (NCEP) has set the  following guidelines (reference values) for HDL Cholesterol:  Low...............<40 mg/dL  Optimal...........>60 mg/dL               HDL/Cholesterol Ratio 24.0             Hematocrit 43.3       42.9       Hemoglobin 14.1       14.2       Immature Grans (Abs) 0.02  Comment: Mild elevation in immature granulocytes is non specific and   can be seen in a variety of conditions including stress response,   acute inflammation, trauma and pregnancy. Correlation with other   laboratory and clinical findings is essential.         0.03  Comment: Mild elevation in immature granulocytes is non specific and   can be seen in a variety of conditions including stress response,   acute inflammation, trauma and pregnancy. Correlation with other   laboratory and clinical findings is essential.         Immature Granulocytes 0.2       0.4       INR 1.0  Comment: Coumadin Therapy:  2.0 - 3.0 for INR for all indicators except mechanical heart valves  and antiphospholipid syndromes which should use 2.5 - 3.5.         1.0  Comment: Coumadin Therapy:  2.0 - 3.0 for INR for all indicators except mechanical heart valves  and antiphospholipid syndromes which should use 2.5 - 3.5.         LDL Cholesterol External 94.2  Comment: The National Cholesterol Education Program (NCEP) has set the  following guidelines (reference values) for LDL Cholesterol:  Optimal.......................<130 mg/dL  Borderline High...............130-159 mg/dL  High..........................160-189 mg/dL  Very High.....................>190 mg/dL               Lymph # 2.8       2.3       Lymph % 31.6       31.8       Magnesium 1.8             MCH 28.7       29.4       MCHC 32.6       33.1       MCV 88       89       Mono # 0.6       0.5       Mono % 7.2       6.7       MPV 10.2       10.0       Non-HDL Cholesterol 127  Comment: Risk category and Non-HDL cholesterol goals:  Coronary heart disease (CHD)or  equivalent (10-year risk of CHD >20%):  Non-HDL cholesterol goal     <130 mg/dL  Two or more CHD risk factors and 10-year risk of CHD <= 20%:  Non-HDL cholesterol goal     <160 mg/dL  0 to 1 CHD risk factor:  Non-HDL cholesterol goal     <190 mg/dL               nRBC 0       0       Phosphorus 3.3             Platelets 203       171       Potassium 4.0       4.4       PROTEIN TOTAL 6.9       6.8       Protime 10.3       10.4       RBC 4.91       4.83       RDW 13.0       12.9       SARS-CoV-2 RNA, Amplification, Qual   Negative  Comment: This test utilizes isothermal nucleic acid amplification   technology to detect the SARS-CoV-2 RdRp nucleic acid segment.   The analytical sensitivity (limit of detection) is 125 genome   equivalents/mL.     A POSITIVE result implies infection with the SARS-CoV-2 virus;  the patient is presumed to be contagious.    A NEGATIVE result means that SARS-CoV-2 nucleic acids are not  present above the limit of detection. A NEGATIVE result should be   treated as presumptive. It does not rule out the possibility of   COVID-19 and should not be the sole basis for treatment decisions.   If COVID-19 is strongly suspected based on clinical and exposure   history, re-testing using an alternate molecular assay should be   considered.       This test is only for use under the Food and Drug   Administration s Emergency Use Authorization (EUA).   Commercial kits are provided by [employer].   Performance characteristics of the EUA have been independently  verified by [facility] Department of  Pathology and Laboratory Medicine.   _________________________________________________________________  The ID NOW COVID-19 Letter of Authorization, along with the   authorized Fact Sheet for Healthcare Providers, the authorized Fact  Sheet for Patients, and authorized labeling are available on the FDA   website:  www.fda.gov/MedicalDevices/Safety/EmergencySituations/nqc829955.htm              Sodium 139       140       Total Cholesterol/HDL Ratio 4.2             Triglycerides 164  Comment: The National Cholesterol Education Program (NCEP) has set the  following guidelines (reference values) for triglycerides:  Normal......................<150 mg/dL  Borderline High.............150-199 mg/dL  High........................200-499 mg/dL               Troponin I 5.634  Comment: The reference interval for Troponin I represents the 99th percentile   cutoff   for our facility and is consistent with 3rd generation assay   performance.       0.065  Comment: The reference interval for Troponin I represents the 99th percentile   cutoff   for our facility and is consistent with 3rd generation assay   performance.     0.006  Comment: The reference interval for Troponin I represents the 99th percentile   cutoff   for our facility and is consistent with 3rd generation assay   performance.          5.634  Comment: The reference interval for Troponin I represents the 99th percentile   cutoff   for our facility and is consistent with 3rd generation assay   performance.               WBC 8.95       7.36             Significant Imaging: I have reviewed all pertinent imaging results/findings within the past 24 hours.

## 2022-01-16 LAB
POCT GLUCOSE: 163 MG/DL (ref 70–110)
POCT GLUCOSE: 166 MG/DL (ref 70–110)
POCT GLUCOSE: 169 MG/DL (ref 70–110)
POCT GLUCOSE: 188 MG/DL (ref 70–110)
POCT GLUCOSE: 210 MG/DL (ref 70–110)

## 2022-01-16 PROCEDURE — 94761 N-INVAS EAR/PLS OXIMETRY MLT: CPT

## 2022-01-16 PROCEDURE — 94660 CPAP INITIATION&MGMT: CPT

## 2022-01-16 PROCEDURE — 99233 PR SUBSEQUENT HOSPITAL CARE,LEVL III: ICD-10-PCS | Mod: ,,, | Performed by: INTERNAL MEDICINE

## 2022-01-16 PROCEDURE — 25000003 PHARM REV CODE 250: Performed by: INTERNAL MEDICINE

## 2022-01-16 PROCEDURE — 27000190 HC CPAP FULL FACE MASK W/VALVE

## 2022-01-16 PROCEDURE — 99900035 HC TECH TIME PER 15 MIN (STAT)

## 2022-01-16 PROCEDURE — 99233 SBSQ HOSP IP/OBS HIGH 50: CPT | Mod: ,,, | Performed by: INTERNAL MEDICINE

## 2022-01-16 PROCEDURE — 25000003 PHARM REV CODE 250: Performed by: NURSE PRACTITIONER

## 2022-01-16 PROCEDURE — 11000001 HC ACUTE MED/SURG PRIVATE ROOM

## 2022-01-16 PROCEDURE — 63600175 PHARM REV CODE 636 W HCPCS: Performed by: INTERNAL MEDICINE

## 2022-01-16 RX ADMIN — METOPROLOL TARTRATE 25 MG: 25 TABLET, FILM COATED ORAL at 09:01

## 2022-01-16 RX ADMIN — CLOPIDOGREL 75 MG: 75 TABLET, FILM COATED ORAL at 09:01

## 2022-01-16 RX ADMIN — VENLAFAXINE 100 MG: 25 TABLET ORAL at 09:01

## 2022-01-16 RX ADMIN — ASPIRIN 81 MG: 81 TABLET, COATED ORAL at 09:01

## 2022-01-16 RX ADMIN — VENLAFAXINE 100 MG: 25 TABLET ORAL at 08:01

## 2022-01-16 RX ADMIN — ENOXAPARIN SODIUM 100 MG: 100 INJECTION SUBCUTANEOUS at 11:01

## 2022-01-16 RX ADMIN — LISINOPRIL 10 MG: 10 TABLET ORAL at 09:01

## 2022-01-16 RX ADMIN — ATORVASTATIN CALCIUM 40 MG: 40 TABLET, FILM COATED ORAL at 09:01

## 2022-01-16 NOTE — SUBJECTIVE & OBJECTIVE
Interval History: Seen at the bedside--no distress noted. Appreciate Cardiology. Planning [facility] on tomorrow or Tuesday.     Review of Systems   Constitutional: Negative for diaphoresis and fatigue.   HENT: Negative for trouble swallowing.    Eyes: Negative for visual disturbance.   Respiratory: Negative for cough and shortness of breath.    Cardiovascular: Negative for chest pain.   Gastrointestinal: Negative for diarrhea, nausea and vomiting.   Genitourinary: Negative for difficulty urinating.   Musculoskeletal: Negative for gait problem and myalgias.   Neurological: Positive for weakness.   Hematological: Does not bruise/bleed easily.   Psychiatric/Behavioral: Negative for confusion.     Objective:     Vital Signs (Most Recent):  Temp: 96.7 °F (35.9 °C) (01/16/22 0828)  Pulse: 85 (01/16/22 0828)  Resp: 18 (01/16/22 0828)  BP: 116/72 (01/16/22 0828)  SpO2: 96 % (01/16/22 0849) Vital Signs (24h Range):  Temp:  [96.3 °F (35.7 °C)-98.6 °F (37 °C)] 96.7 °F (35.9 °C)  Pulse:  [70-87] 85  Resp:  [16-20] 18  SpO2:  [93 %-97 %] 96 %  BP: (116-146)/(67-72) 116/72     Weight: 103 kg (227 lb 1.2 oz)  Body mass index is 30.8 kg/m².  No intake or output data in the 24 hours ending 01/16/22 1057   Physical Exam  Vitals and nursing note reviewed.   Constitutional:       Appearance: He is obese.   HENT:      Head: Normocephalic and atraumatic.      Mouth/Throat:      Mouth: Mucous membranes are moist.   Eyes:      Extraocular Movements: Extraocular movements intact.      Conjunctiva/sclera: Conjunctivae normal.   Cardiovascular:      Rate and Rhythm: Normal rate and regular rhythm.      Pulses: Normal pulses.   Pulmonary:      Effort: Pulmonary effort is normal. No respiratory distress.   Abdominal:      General: There is no distension.      Tenderness: There is no abdominal tenderness.   Musculoskeletal:         General: Normal range of motion.      Cervical back: Normal range of motion.   Skin:     General: Skin is warm and dry.       Capillary Refill: Capillary refill takes 2 to 3 seconds.   Neurological:      Mental Status: He is alert and oriented to person, place, and time.   Psychiatric:         Mood and Affect: Mood normal.         Significant Labs: All pertinent labs within the past 24 hours have been reviewed.    Significant Imaging: I have reviewed all pertinent imaging results/findings within the past 24 hours.

## 2022-01-16 NOTE — PROGRESS NOTES
[facility] Medicine  Progress Note    Patient Name: Hilario Vivar  MRN: [id number]  Patient Class: IP- Inpatient   Admission Date: 1/14/2022  Length of Stay: 2 days  Attending Physician: Patsy Burkett*  Primary Care Provider: Arthur Cavazos MD        Subjective:     Principal Problem:NSTEMI (non-ST elevated myocardial infarction)        HPI:  Hilario Vivar is a 76 year old male with a PMHx of Hilario Vivar is a 76 y.o. male patient with a past medical history of CAD (AMI 1980's, proximal LAD PCI 1995, distal circ stents 2005), PAF/atrial flutter s/p ablation, HTN, HLD, Osteoarthritis and DM who presented to [place] ED ~ 9:30 am on 1/14/21 with complaints of chest pain, unrelieved by nitroglycerin SL X 3 and 4 81 mg Aspirin. The chest pain started about 4:30 am. Patient reports taking nitro and aspiring but with no relief of pain. Denies pain radiation reports substernal pain. Pain was reported 6/10 then decreased to 2/10 after morphine and  mg. While there he was noted to have VS (0627 h) HR 49, /67, RR 18, SpO2 96% (RA).  Labs:  CBC and CMP unremarkable except for Glu 235, troponin 0.006 > 0.065, BNP 22, COVID neg. EKG sinus bradycardia (48) with no acute changes, CXR clear. Patient given aspirin (324) and morphine. Over the next few h the pain improved (6 > 2).  Patient resting comfortably.  Transfer requested for higher level of care (cardiology).                       Overview/Hospital Course:  He was admitted to the [facility] service for further care. Troponin was elevated but now declining. He was loaded with Plavix and started on full dose Lovenox. Cardiology was consulted. Planning [facility] on tomorrow or Tuesday.       Interval History: Seen at the bedside--no distress noted. Appreciate Cardiology. Planning [facility] on tomorrow or Tuesday.     Review of Systems   Constitutional: Negative for diaphoresis and fatigue.   HENT: Negative for trouble swallowing.     Eyes: Negative for visual disturbance.   Respiratory: Negative for cough and shortness of breath.    Cardiovascular: Negative for chest pain.   Gastrointestinal: Negative for diarrhea, nausea and vomiting.   Genitourinary: Negative for difficulty urinating.   Musculoskeletal: Negative for gait problem and myalgias.   Neurological: Positive for weakness.   Hematological: Does not bruise/bleed easily.   Psychiatric/Behavioral: Negative for confusion.     Objective:     Vital Signs (Most Recent):  Temp: 96.7 °F (35.9 °C) (01/16/22 0828)  Pulse: 85 (01/16/22 0828)  Resp: 18 (01/16/22 0828)  BP: 116/72 (01/16/22 0828)  SpO2: 96 % (01/16/22 0849) Vital Signs (24h Range):  Temp:  [96.3 °F (35.7 °C)-98.6 °F (37 °C)] 96.7 °F (35.9 °C)  Pulse:  [70-87] 85  Resp:  [16-20] 18  SpO2:  [93 %-97 %] 96 %  BP: (116-146)/(67-72) 116/72     Weight: 103 kg (227 lb 1.2 oz)  Body mass index is 30.8 kg/m².  No intake or output data in the 24 hours ending 01/16/22 1057   Physical Exam  Vitals and nursing note reviewed.   Constitutional:       Appearance: He is obese.   HENT:      Head: Normocephalic and atraumatic.      Mouth/Throat:      Mouth: Mucous membranes are moist.   Eyes:      Extraocular Movements: Extraocular movements intact.      Conjunctiva/sclera: Conjunctivae normal.   Cardiovascular:      Rate and Rhythm: Normal rate and regular rhythm.      Pulses: Normal pulses.   Pulmonary:      Effort: Pulmonary effort is normal. No respiratory distress.   Abdominal:      General: There is no distension.      Tenderness: There is no abdominal tenderness.   Musculoskeletal:         General: Normal range of motion.      Cervical back: Normal range of motion.   Skin:     General: Skin is warm and dry.      Capillary Refill: Capillary refill takes 2 to 3 seconds.   Neurological:      Mental Status: He is alert and oriented to person, place, and time.   Psychiatric:         Mood and Affect: Mood normal.         Significant Labs: All  pertinent labs within the past 24 hours have been reviewed.    Significant Imaging: I have reviewed all pertinent imaging results/findings within the past 24 hours.      Assessment/Plan:      * NSTEMI (non-ST elevated myocardial infarction)  Transferred from [place] for ACS work up  History of MI in 80s treated medically   Troponin 0.006-0.065-5.634  Received  mg and morphine PTA with some pain relief, EKG with Sinus bradycardia with 1st degree A-V block. Cannot rule out Anterior infarct  No current complaints of CP or SOB  With last troponin level increase repeat EKG with: Sinus rhythm with 1st degree A-V block with occasional Premature ventricular complexes. Possible Anterior infarct  -trend troponin  -trend EKG  -NPO  -ECHO pending  -appreciate cardiology  -loaded with Plavix  -full dose Lovenox        Sleep apnea  CPAP QHS      PTSD (post-traumatic stress disorder)  Depression    Takes prazosin, Effexor, Remeron, at home  Will resume      Type 2 diabetes mellitus, without long-term current use of insulin  Hemoglobin A1C   Date Value Ref Range Status   01/15/2022 7.6 (H) 4.0 - 5.6 % Final     Comment:     ADA Screening Guidelines:  5.7-6.4%  Consistent with prediabetes  >or=6.5%  Consistent with diabetes    High levels of fetal hemoglobin interfere with the HbA1C  assay. Heterozygous hemoglobin variants (HbS, HgC, etc)do  not significantly interfere with this assay.   However, presence of multiple variants may affect accuracy.       -A1C pending, SSI, accuchecks Q6 while NPO, diabetic diet once allowed to eat      HTN (hypertension)  Hypertensive on admission  Takes lisinopril, metoprolol at home-resume and monitor      Coronary artery disease involving native coronary artery  PCI proximal LAD in 1995, Coronary stents in distal circumflex 2005   Continue home ASA, coreg, statin        VTE Risk Mitigation (From admission, onward)         Ordered     enoxaparin injection 100 mg  Every 12 hours (non-standard  times)         01/15/22 1127     IP VTE HIGH RISK PATIENT  Once         01/15/22 0015     Place sequential compression device  Until discontinued         01/15/22 0015                Discharge Planning   ALONDRA:      Code Status: Full Code   Is the patient medically ready for discharge?:     Reason for patient still in hospital (select all that apply): Treatment and Consult recommendations                     Sandra Neely NP  Department of Hospital Medicine   [facility]

## 2022-01-16 NOTE — PROGRESS NOTES
[facility] - Telemetry  Cardiology  Consult Note    Patient Name: Hilario Vivar  MRN: [id number]  Admission Date: 1/14/2022  Hospital Length of Stay: 2 days  Code Status: Full Code   Attending Provider: Patsy Burkett*   Consulting Provider: Rivas Conner MD  Primary Care Physician: Arthur Cavazos MD  Principal Problem:NSTEMI (non-ST elevated myocardial infarction)    Patient information was obtained from patient and ER records.     Consults  Subjective:     Chief Complaint:  NSTEMI     HPI:   77 y/o male with hx of CAD s/p PCI  (AMI 1980's, proximal LAD PCI 1995, distal circ stents 2005), PAfib s/p RFA, HTN, DM who presented with CP and diagnosed with NSTEMI. Last trop 5.127. BP stable and no recurrent CP.    1/16/2022  Overnight no acute events. BP stable. Trop peaked at 5.127 and downtrending. BP stable.       Past Medical History:   Diagnosis Date    AF (paroxysmal atrial fibrillation)     Atrial flutter     Coronary artery disease     Diabetes mellitus     Hypertension     Myocardial infarction     Osteoarthritis        History reviewed. No pertinent surgical history.    Review of patient's allergies indicates:  No Known Allergies    No current facility-administered medications on file prior to encounter.     Current Outpatient Medications on File Prior to Encounter   Medication Sig    acetaminophen (TYLENOL) 500 MG tablet Take 500 mg by mouth every 4 (four) hours as needed.    aspirin (ECOTRIN) 81 MG EC tablet Take 81 mg by mouth once daily.    atorvastatin (LIPITOR) 40 MG tablet Take 40 mg by mouth once daily.    canagliflozin (INVOKANA) 300 mg Tab tablet Take 1 tablet by mouth once daily.    cholecalciferol, vitamin D3, (VITAMIN D3) 50 mcg (2,000 unit) Cap Take by mouth.    coenzyme Q10 200 mg capsule Take 200 mg by mouth.    docosahexaenoic acid-epa 120-180 mg Cap Take 1,000 mg by mouth.    doxepin (SINEQUAN) 10 MG capsule Take 10 mg by mouth.    glipiZIDE (GLUCOTROL) 10 MG  tablet Take 10 mg by mouth 2 (two) times daily before meals.    INV doxazosin (CARDURA) 8 MG Tab Take 4 mg by mouth once daily. FOR INVESTIGATIONAL USE ONLY    INVOKANA 300 mg Tab tablet     lisinopriL 10 MG tablet Take 10 mg by mouth once daily.    magnesium oxide (MAG-OX) 400 mg (241.3 mg magnesium) tablet Take 400 mg by mouth once daily.    meloxicam (MOBIC) 15 MG tablet Take 15 mg by mouth once daily.    metFORMIN (GLUCOPHAGE) 1000 MG tablet Take 1,000 mg by mouth 2 (two) times daily with meals.    metoprolol tartrate (LOPRESSOR) 25 MG tablet Take 25 mg by mouth.    mirtazapine (REMERON) 30 MG tablet Take 30 mg by mouth.    multivitamin (MULTIPLE VITAMINS DAILY ORAL) Take by mouth.    omega-3 fatty acids 1,000 mg Cap Take 2 g by mouth.    omeprazole (PRILOSEC) 20 MG capsule Take 1 capsule by mouth once daily.    prazosin (MINIPRESS) 5 MG capsule Take 5 mg by mouth every evening.    tamsulosin HCl (FLOMAX ORAL) Take by mouth.    venlafaxine (EFFEXOR) 100 MG Tab Take by mouth 2 (two) times daily.    VITAMIN B COMPLEX ORAL Take by mouth.    vitamin D (VITAMIN D3) 1000 units Tab Take by mouth.     Family History    None       Tobacco Use    Smoking status: Not on file    Smokeless tobacco: Not on file   Substance and Sexual Activity    Alcohol use: Not on file    Drug use: Not on file    Sexual activity: Not on file     Review of Systems   Constitutional: Negative for malaise/fatigue.   HENT: Negative for congestion.    Eyes: Negative for blurred vision.   Cardiovascular: Negative for chest pain, claudication, cyanosis, dyspnea on exertion, irregular heartbeat, leg swelling, near-syncope, orthopnea, palpitations, paroxysmal nocturnal dyspnea and syncope.   Respiratory: Negative for shortness of breath.    Endocrine: Negative for polyuria.   Hematologic/Lymphatic: Negative for bleeding problem.   Skin: Negative for itching and rash.   Musculoskeletal: Negative for joint swelling, muscle cramps  and muscle weakness.   Gastrointestinal: Negative for abdominal pain, hematemesis, hematochezia, melena, nausea and vomiting.   Genitourinary: Negative for dysuria and hematuria.   Neurological: Negative for dizziness, focal weakness, headaches, light-headedness, loss of balance and weakness.   Psychiatric/Behavioral: Negative for depression. The patient is not nervous/anxious.      Objective:     Vital Signs (Most Recent):  Temp: 98.6 °F (37 °C) (01/16/22 1155)  Pulse: 69 (01/16/22 1155)  Resp: 20 (01/16/22 1155)  BP: 107/62 (01/16/22 1155)  SpO2: (!) 94 % (01/16/22 1155) Vital Signs (24h Range):  Temp:  [96.3 °F (35.7 °C)-98.6 °F (37 °C)] 98.6 °F (37 °C)  Pulse:  [65-87] 69  Resp:  [16-20] 20  SpO2:  [93 %-97 %] 94 %  BP: (107-146)/(62-72) 107/62     Weight: 103 kg (227 lb 1.2 oz)  Body mass index is 30.8 kg/m².    SpO2: (!) 94 %  O2 Device (Oxygen Therapy): room air    No intake or output data in the 24 hours ending 01/16/22 1457    Lines/Drains/Airways     Peripheral Intravenous Line                 Peripheral IV - Single Lumen 01/14/22 0630 20 G Left Antecubital 2 days                Physical Exam  Constitutional:       Appearance: He is well-developed.   HENT:      Head: Normocephalic and atraumatic.   Neck:      Vascular: No JVD.   Cardiovascular:      Rate and Rhythm: Normal rate and regular rhythm.      Pulses:           Carotid pulses are 2+ on the right side and 2+ on the left side.       Radial pulses are 2+ on the right side and 2+ on the left side.        Femoral pulses are 2+ on the right side and 2+ on the left side.       Dorsalis pedis pulses are 2+ on the right side and 2+ on the left side.        Posterior tibial pulses are 2+ on the right side and 2+ on the left side.      Heart sounds: Normal heart sounds.   Pulmonary:      Effort: Pulmonary effort is normal.      Breath sounds: Normal breath sounds.   Abdominal:      General: Bowel sounds are normal.      Palpations: Abdomen is soft.    Musculoskeletal:      Cervical back: Neck supple.   Skin:     General: Skin is warm and dry.   Neurological:      Mental Status: He is alert and oriented to person, place, and time.   Psychiatric:         Behavior: Behavior normal.         Thought Content: Thought content normal.         Significant Labs:   ABG: No results for input(s): PH, PCO2, HCO3, POCSATURATED, BE in the last 48 hours., BMP:   Recent Labs   Lab 01/15/22  0034   *      K 4.0      CO2 24   BUN 25*   CREATININE 0.8   CALCIUM 8.9   MG 1.8   , CMP   Recent Labs   Lab 01/15/22  0034      K 4.0      CO2 24   *   BUN 25*   CREATININE 0.8   CALCIUM 8.9   PROT 6.9   ALBUMIN 3.5   BILITOT 0.3   ALKPHOS 60   AST 44*   ALT 36   ANIONGAP 9   ESTGFRAFRICA >60   EGFRNONAA >60   , CBC   Recent Labs   Lab 01/15/22  0034   WBC 8.95   HGB 14.1   HCT 43.3      , INR   Recent Labs   Lab 01/15/22  0034   INR 1.0   , Lipid Panel   Recent Labs   Lab 01/15/22  0034   CHOL 167   HDL 40   LDLCALC 94.2   TRIG 164*   CHOLHDL 24.0    and Troponin   Recent Labs   Lab 01/15/22  0626 01/15/22  1202 01/15/22  1832   TROPONINI 5.127* 4.919* 3.763*       Assessment and Plan:     Active Diagnoses:    Diagnosis Date Noted POA    PRINCIPAL PROBLEM:  NSTEMI (non-ST elevated myocardial infarction) [I21.4] 01/14/2022 Yes    Coronary artery disease involving native coronary artery [I25.10] 01/14/2022 Yes     Chronic    HTN (hypertension) [I10] 01/14/2022 Yes    Type 2 diabetes mellitus, without long-term current use of insulin [E11.9] 01/14/2022 Yes    Depression [F32.A] 01/14/2022 Yes    PTSD (post-traumatic stress disorder) [F43.10] 01/14/2022 Yes    Sleep apnea [G47.30] 01/14/2022 Yes     Chronic      Problems Resolved During this Admission:    Diagnosis Date Noted Date Resolved POA    ACS (acute coronary syndrome) [I24.9] 01/15/2022 01/15/2022 Yes     NSTEMI  -ACS protocol   -Plan for [facility] Tuesday  -2DE on Tuesday      Afib  -currently in SR  -s/p RFA    HTN  -stable    HLD  -statin    CAD   -per above    VTE Risk Mitigation (From admission, onward)         Ordered     enoxaparin injection 100 mg  Every 12 hours (non-standard times)         01/15/22 1127     IP VTE HIGH RISK PATIENT  Once         01/15/22 0015     Place sequential compression device  Until discontinued         01/15/22 0015                Thank you for your consult. I will follow-up with patient. Please contact us if you have any additional questions.    Rivas Conner MD  Cardiology   [facility] - Telemetry

## 2022-01-16 NOTE — ASSESSMENT & PLAN NOTE
Transferred from [place] for ACS work up  History of MI in 80s treated medically   Troponin 0.006-0.065-5.634  Received  mg and morphine PTA with some pain relief, EKG with Sinus bradycardia with 1st degree A-V block. Cannot rule out Anterior infarct  No current complaints of CP or SOB  With last troponin level increase repeat EKG with: Sinus rhythm with 1st degree A-V block with occasional Premature ventricular complexes. Possible Anterior infarct  -trend troponin  -trend EKG  -NPO  -ECHO pending  -appreciate cardiology  -loaded with Plavix  -full dose Lovenox

## 2022-01-16 NOTE — HOSPITAL COURSE
He was [place] [facility] [place] care. Troponin was elevated but now declining. He was loaded with Plavix and started on full dose Lovenox. Cardiology was consulted. He had a left heart cath through the right radial, he had 1 stent placed in the distal LCX and remained in stable condition afterwards. He was discharged home in stable condition with aspirin and plavix and statin.

## 2022-01-17 LAB
ANION GAP SERPL CALC-SCNC: 13 MMOL/L (ref 8–16)
AORTIC ROOT ANNULUS: 3.73 CM
ASCENDING AORTA: 2.87 CM
AV INDEX (PROSTH): 0.52
AV MEAN GRADIENT: 7 MMHG
AV PEAK GRADIENT: 11 MMHG
AV VALVE AREA: 2.14 CM2
AV VELOCITY RATIO: 0.48
BASOPHILS # BLD AUTO: 0.03 K/UL (ref 0–0.2)
BASOPHILS NFR BLD: 0.4 % (ref 0–1.9)
BSA FOR ECHO PROCEDURE: 2.29 M2
BUN SERPL-MCNC: 20 MG/DL (ref 8–23)
CALCIUM SERPL-MCNC: 8.9 MG/DL (ref 8.7–10.5)
CHLORIDE SERPL-SCNC: 104 MMOL/L (ref 95–110)
CO2 SERPL-SCNC: 21 MMOL/L (ref 23–29)
CREAT SERPL-MCNC: 0.9 MG/DL (ref 0.5–1.4)
CV ECHO LV RWT: 0.43 CM
DIFFERENTIAL METHOD: NORMAL
DOP CALC AO PEAK VEL: 1.63 M/S
DOP CALC AO VTI: 36.98 CM
DOP CALC LVOT AREA: 4.1 CM2
DOP CALC LVOT DIAMETER: 2.29 CM
DOP CALC LVOT PEAK VEL: 0.78 M/S
DOP CALC LVOT STROKE VOLUME: 79.12 CM3
DOP CALC MV VTI: 20.54 CM
DOP CALCLVOT PEAK VEL VTI: 19.22 CM
E WAVE DECELERATION TIME: 300.43 MSEC
E/A RATIO: 0.55
E/E' RATIO: 9.4 M/S
ECHO LV POSTERIOR WALL: 1 CM (ref 0.6–1.1)
EJECTION FRACTION: 65 %
EOSINOPHIL # BLD AUTO: 0.1 K/UL (ref 0–0.5)
EOSINOPHIL NFR BLD: 1.2 % (ref 0–8)
ERYTHROCYTE [DISTWIDTH] IN BLOOD BY AUTOMATED COUNT: 13 % (ref 11.5–14.5)
EST. GFR  (AFRICAN AMERICAN): >60 ML/MIN/1.73 M^2
EST. GFR  (NON AFRICAN AMERICAN): >60 ML/MIN/1.73 M^2
FRACTIONAL SHORTENING: 33 % (ref 28–44)
GLUCOSE SERPL-MCNC: 196 MG/DL (ref 70–110)
HCT VFR BLD AUTO: 44.1 % (ref 40–54)
HGB BLD-MCNC: 14.9 G/DL (ref 14–18)
IMM GRANULOCYTES # BLD AUTO: 0.03 K/UL (ref 0–0.04)
IMM GRANULOCYTES NFR BLD AUTO: 0.4 % (ref 0–0.5)
INTERVENTRICULAR SEPTUM: 1.1 CM (ref 0.6–1.1)
LA MAJOR: 6.09 CM
LA MINOR: 5.97 CM
LA WIDTH: 4.05 CM
LEFT ATRIUM SIZE: 4.21 CM
LEFT ATRIUM VOLUME INDEX MOD: 29.4 ML/M2
LEFT ATRIUM VOLUME INDEX: 38.8 ML/M2
LEFT ATRIUM VOLUME MOD: 66.25 CM3
LEFT ATRIUM VOLUME: 87.38 CM3
LEFT INTERNAL DIMENSION IN SYSTOLE: 3.09 CM (ref 2.1–4)
LEFT VENTRICLE DIASTOLIC VOLUME INDEX: 37.33 ML/M2
LEFT VENTRICLE DIASTOLIC VOLUME: 84 ML
LEFT VENTRICLE MASS INDEX: 75 G/M2
LEFT VENTRICLE SYSTOLIC VOLUME INDEX: 16.7 ML/M2
LEFT VENTRICLE SYSTOLIC VOLUME: 37.54 ML
LEFT VENTRICULAR INTERNAL DIMENSION IN DIASTOLE: 4.6 CM (ref 3.5–6)
LEFT VENTRICULAR MASS: 169.85 G
LV LATERAL E/E' RATIO: 9.4 M/S
LV SEPTAL E/E' RATIO: 9.4 M/S
LYMPHOCYTES # BLD AUTO: 2.2 K/UL (ref 1–4.8)
LYMPHOCYTES NFR BLD: 33 % (ref 18–48)
MCH RBC QN AUTO: 29.5 PG (ref 27–31)
MCHC RBC AUTO-ENTMCNC: 33.8 G/DL (ref 32–36)
MCV RBC AUTO: 87 FL (ref 82–98)
MONOCYTES # BLD AUTO: 0.7 K/UL (ref 0.3–1)
MONOCYTES NFR BLD: 9.7 % (ref 4–15)
MV A" WAVE DURATION": 11.42 MSEC
MV MEAN GRADIENT: 0 MMHG
MV PEAK A VEL: 0.86 M/S
MV PEAK E VEL: 0.47 M/S
MV PEAK GRADIENT: 3 MMHG
MV STENOSIS PRESSURE HALF TIME: 87.12 MS
MV VALVE AREA BY CONTINUITY EQUATION: 3.85 CM2
MV VALVE AREA P 1/2 METHOD: 2.53 CM2
NEUTROPHILS # BLD AUTO: 3.7 K/UL (ref 1.8–7.7)
NEUTROPHILS NFR BLD: 55.3 % (ref 38–73)
NRBC BLD-RTO: 0 /100 WBC
PLATELET # BLD AUTO: 196 K/UL (ref 150–450)
PMV BLD AUTO: 10.3 FL (ref 9.2–12.9)
POCT GLUCOSE: 168 MG/DL (ref 70–110)
POCT GLUCOSE: 210 MG/DL (ref 70–110)
POCT GLUCOSE: 255 MG/DL (ref 70–110)
POCT GLUCOSE: 259 MG/DL (ref 70–110)
POTASSIUM SERPL-SCNC: 4.2 MMOL/L (ref 3.5–5.1)
PULM VEIN S/D RATIO: 1.71
PV PEAK D VEL: 0.38 M/S
PV PEAK S VEL: 0.65 M/S
PV PEAK VELOCITY: 0.95 CM/S
RA MAJOR: 4.98 CM
RA PRESSURE: 8 MMHG
RA WIDTH: 3.11 CM
RBC # BLD AUTO: 5.05 M/UL (ref 4.6–6.2)
RIGHT VENTRICULAR END-DIASTOLIC DIMENSION: 3.15 CM
RV TISSUE DOPPLER FREE WALL SYSTOLIC VELOCITY 1 (APICAL 4 CHAMBER VIEW): 11.72 CM/S
SARS-COV-2 RDRP RESP QL NAA+PROBE: NEGATIVE
SODIUM SERPL-SCNC: 138 MMOL/L (ref 136–145)
STJ: 3.09 CM
TDI LATERAL: 0.05 M/S
TDI SEPTAL: 0.05 M/S
TDI: 0.05 M/S
TRICUSPID ANNULAR PLANE SYSTOLIC EXCURSION: 1.76 CM
WBC # BLD AUTO: 6.72 K/UL (ref 3.9–12.7)

## 2022-01-17 PROCEDURE — 99233 SBSQ HOSP IP/OBS HIGH 50: CPT | Mod: 25,,, | Performed by: INTERNAL MEDICINE

## 2022-01-17 PROCEDURE — 63600175 PHARM REV CODE 636 W HCPCS: Performed by: INTERNAL MEDICINE

## 2022-01-17 PROCEDURE — 85025 COMPLETE CBC W/AUTO DIFF WBC: CPT | Performed by: STUDENT IN AN ORGANIZED HEALTH CARE EDUCATION/TRAINING PROGRAM

## 2022-01-17 PROCEDURE — 36415 COLL VENOUS BLD VENIPUNCTURE: CPT | Performed by: STUDENT IN AN ORGANIZED HEALTH CARE EDUCATION/TRAINING PROGRAM

## 2022-01-17 PROCEDURE — U0002 COVID-19 LAB TEST NON-CDC: HCPCS | Performed by: INTERNAL MEDICINE

## 2022-01-17 PROCEDURE — 25500020 PHARM REV CODE 255: Performed by: INTERNAL MEDICINE

## 2022-01-17 PROCEDURE — 99233 PR SUBSEQUENT HOSPITAL CARE,LEVL III: ICD-10-PCS | Mod: 25,,, | Performed by: INTERNAL MEDICINE

## 2022-01-17 PROCEDURE — 25000003 PHARM REV CODE 250: Performed by: NURSE PRACTITIONER

## 2022-01-17 PROCEDURE — 11000001 HC ACUTE MED/SURG PRIVATE ROOM

## 2022-01-17 PROCEDURE — 25000003 PHARM REV CODE 250: Performed by: INTERNAL MEDICINE

## 2022-01-17 PROCEDURE — 80048 BASIC METABOLIC PNL TOTAL CA: CPT | Performed by: STUDENT IN AN ORGANIZED HEALTH CARE EDUCATION/TRAINING PROGRAM

## 2022-01-17 RX ADMIN — Medication 6 MG: at 08:01

## 2022-01-17 RX ADMIN — INSULIN ASPART 3 UNITS: 100 INJECTION, SOLUTION INTRAVENOUS; SUBCUTANEOUS at 05:01

## 2022-01-17 RX ADMIN — VENLAFAXINE 100 MG: 25 TABLET ORAL at 08:01

## 2022-01-17 RX ADMIN — VENLAFAXINE 100 MG: 25 TABLET ORAL at 09:01

## 2022-01-17 RX ADMIN — HUMAN ALBUMIN MICROSPHERES AND PERFLUTREN 0.11 MG: 10; .22 INJECTION, SOLUTION INTRAVENOUS at 10:01

## 2022-01-17 RX ADMIN — ENOXAPARIN SODIUM 100 MG: 100 INJECTION SUBCUTANEOUS at 11:01

## 2022-01-17 RX ADMIN — INSULIN ASPART 2 UNITS: 100 INJECTION, SOLUTION INTRAVENOUS; SUBCUTANEOUS at 11:01

## 2022-01-17 RX ADMIN — INSULIN ASPART 1 UNITS: 100 INJECTION, SOLUTION INTRAVENOUS; SUBCUTANEOUS at 08:01

## 2022-01-17 RX ADMIN — CLOPIDOGREL 75 MG: 75 TABLET, FILM COATED ORAL at 08:01

## 2022-01-17 RX ADMIN — ATORVASTATIN CALCIUM 40 MG: 40 TABLET, FILM COATED ORAL at 08:01

## 2022-01-17 RX ADMIN — LISINOPRIL 10 MG: 10 TABLET ORAL at 08:01

## 2022-01-17 RX ADMIN — ENOXAPARIN SODIUM 100 MG: 100 INJECTION SUBCUTANEOUS at 12:01

## 2022-01-17 RX ADMIN — METOPROLOL TARTRATE 25 MG: 25 TABLET, FILM COATED ORAL at 08:01

## 2022-01-17 RX ADMIN — ASPIRIN 81 MG: 81 TABLET, COATED ORAL at 08:01

## 2022-01-17 NOTE — PROGRESS NOTES
[facility] - Telemetry  Cardiology  Progress Note    Patient Name: Hilario Vivar  MRN: [id number]  Admission Date: 1/14/2022  Hospital Length of Stay: 3 days  Code Status: Full Code   Attending Provider: Ayana Moulton MD   Consulting Provider: Rivas Conner MD  Primary Care Physician: Arthur Cavazos MD  Principal Problem:NSTEMI (non-ST elevated myocardial infarction)    Patient information was obtained from patient and ER records.     Consults  Subjective:     Chief Complaint:  NSTEMI     HPI:   75 y/o male with hx of CAD s/p PCI  (AMI 1980's, proximal LAD PCI 1995, distal circ stents 2005), PAfib s/p RFA, HTN, DM who presented with CP and diagnosed with NSTEMI. Last trop 5.127. BP stable and no recurrent CP.    1/16/2022  Overnight no acute events. BP stable. Trop peaked at 5.127 and downtrending. BP stable.     1/17/2022  Overnight no acute events. BP stable. No symptoms currently.      Past Medical History:   Diagnosis Date    AF (paroxysmal atrial fibrillation)     Atrial flutter     Coronary artery disease     Diabetes mellitus     Hypertension     Myocardial infarction     Osteoarthritis        History reviewed. No pertinent surgical history.    Review of patient's allergies indicates:  No Known Allergies    No current facility-administered medications on file prior to encounter.     Current Outpatient Medications on File Prior to Encounter   Medication Sig    acetaminophen (TYLENOL) 500 MG tablet Take 500 mg by mouth every 4 (four) hours as needed.    aspirin (ECOTRIN) 81 MG EC tablet Take 81 mg by mouth once daily.    atorvastatin (LIPITOR) 40 MG tablet Take 40 mg by mouth once daily.    canagliflozin (INVOKANA) 300 mg Tab tablet Take 1 tablet by mouth once daily.    cholecalciferol, vitamin D3, (VITAMIN D3) 50 mcg (2,000 unit) Cap Take by mouth.    coenzyme Q10 200 mg capsule Take 200 mg by mouth.    docosahexaenoic acid-epa 120-180 mg Cap Take 1,000 mg by mouth.    doxepin  (SINEQUAN) 10 MG capsule Take 10 mg by mouth.    glipiZIDE (GLUCOTROL) 10 MG tablet Take 10 mg by mouth 2 (two) times daily before meals.    INV doxazosin (CARDURA) 8 MG Tab Take 4 mg by mouth once daily. FOR INVESTIGATIONAL USE ONLY    INVOKANA 300 mg Tab tablet     lisinopriL 10 MG tablet Take 10 mg by mouth once daily.    magnesium oxide (MAG-OX) 400 mg (241.3 mg magnesium) tablet Take 400 mg by mouth once daily.    meloxicam (MOBIC) 15 MG tablet Take 15 mg by mouth once daily.    metFORMIN (GLUCOPHAGE) 1000 MG tablet Take 1,000 mg by mouth 2 (two) times daily with meals.    metoprolol tartrate (LOPRESSOR) 25 MG tablet Take 25 mg by mouth.    mirtazapine (REMERON) 30 MG tablet Take 30 mg by mouth.    multivitamin (MULTIPLE VITAMINS DAILY ORAL) Take by mouth.    omega-3 fatty acids 1,000 mg Cap Take 2 g by mouth.    omeprazole (PRILOSEC) 20 MG capsule Take 1 capsule by mouth once daily.    prazosin (MINIPRESS) 5 MG capsule Take 5 mg by mouth every evening.    tamsulosin HCl (FLOMAX ORAL) Take by mouth.    venlafaxine (EFFEXOR) 100 MG Tab Take by mouth 2 (two) times daily.    VITAMIN B COMPLEX ORAL Take by mouth.    vitamin D (VITAMIN D3) 1000 units Tab Take by mouth.     Family History    None       Tobacco Use    Smoking status: Not on file    Smokeless tobacco: Not on file   Substance and Sexual Activity    Alcohol use: Not on file    Drug use: Not on file    Sexual activity: Not on file     Review of Systems   Constitutional: Negative for malaise/fatigue.   HENT: Negative for congestion.    Eyes: Negative for blurred vision.   Cardiovascular: Negative for chest pain, claudication, cyanosis, dyspnea on exertion, irregular heartbeat, leg swelling, near-syncope, orthopnea, palpitations, paroxysmal nocturnal dyspnea and syncope.   Respiratory: Negative for shortness of breath.    Endocrine: Negative for polyuria.   Hematologic/Lymphatic: Negative for bleeding problem.   Skin: Negative for  itching and rash.   Musculoskeletal: Negative for joint swelling, muscle cramps and muscle weakness.   Gastrointestinal: Negative for abdominal pain, hematemesis, hematochezia, melena, nausea and vomiting.   Genitourinary: Negative for dysuria and hematuria.   Neurological: Negative for dizziness, focal weakness, headaches, light-headedness, loss of balance and weakness.   Psychiatric/Behavioral: Negative for depression. The patient is not nervous/anxious.      Objective:     Vital Signs (Most Recent):  Temp: 98.3 °F (36.8 °C) (01/17/22 1145)  Pulse: 63 (01/17/22 1156)  Resp: 20 (01/17/22 1145)  BP: (!) 109/53 (01/17/22 1145)  SpO2: (!) 93 % (01/17/22 1145) Vital Signs (24h Range):  Temp:  [96.6 °F (35.9 °C)-98.3 °F (36.8 °C)] 98.3 °F (36.8 °C)  Pulse:  [63-79] 63  Resp:  [16-20] 20  SpO2:  [93 %-99 %] 93 %  BP: (107-131)/(53-69) 109/53     Weight: 103 kg (227 lb)  Body mass index is 30.79 kg/m².    SpO2: (!) 93 %  O2 Device (Oxygen Therapy): room air    No intake or output data in the 24 hours ending 01/17/22 1234    Lines/Drains/Airways     Peripheral Intravenous Line                 Peripheral IV - Single Lumen 01/14/22 0630 20 G Left Antecubital 3 days                Physical Exam  Constitutional:       Appearance: He is well-developed.   HENT:      Head: Normocephalic and atraumatic.   Neck:      Vascular: No JVD.   Cardiovascular:      Rate and Rhythm: Normal rate and regular rhythm.      Pulses:           Carotid pulses are 2+ on the right side and 2+ on the left side.       Radial pulses are 2+ on the right side and 2+ on the left side.        Femoral pulses are 2+ on the right side and 2+ on the left side.       Dorsalis pedis pulses are 2+ on the right side and 2+ on the left side.        Posterior tibial pulses are 2+ on the right side and 2+ on the left side.      Heart sounds: Normal heart sounds.   Pulmonary:      Effort: Pulmonary effort is normal.      Breath sounds: Normal breath sounds.    Abdominal:      General: Bowel sounds are normal.      Palpations: Abdomen is soft.   Musculoskeletal:      Cervical back: Neck supple.   Skin:     General: Skin is warm and dry.   Neurological:      Mental Status: He is alert and oriented to person, place, and time.   Psychiatric:         Behavior: Behavior normal.         Thought Content: Thought content normal.         Significant Labs:   ABG: No results for input(s): PH, PCO2, HCO3, POCSATURATED, BE in the last 48 hours., BMP:   Recent Labs   Lab 01/17/22  0741   *      K 4.2      CO2 21*   BUN 20   CREATININE 0.9   CALCIUM 8.9   , CMP   Recent Labs   Lab 01/17/22  0741      K 4.2      CO2 21*   *   BUN 20   CREATININE 0.9   CALCIUM 8.9   ANIONGAP 13   ESTGFRAFRICA >60   EGFRNONAA >60   , CBC   Recent Labs   Lab 01/17/22  0741   WBC 6.72   HGB 14.9   HCT 44.1      , INR   No results for input(s): INR, PROTIME in the last 48 hours., Lipid Panel   No results for input(s): CHOL, HDL, LDLCALC, TRIG, CHOLHDL in the last 48 hours. and Troponin   Recent Labs   Lab 01/15/22  1832   TROPONINI 3.763*       Assessment and Plan:     Active Diagnoses:    Diagnosis Date Noted POA    PRINCIPAL PROBLEM:  NSTEMI (non-ST elevated myocardial infarction) [I21.4] 01/14/2022 Yes    Coronary artery disease involving native coronary artery [I25.10] 01/14/2022 Yes     Chronic    HTN (hypertension) [I10] 01/14/2022 Yes    Type 2 diabetes mellitus, without long-term current use of insulin [E11.9] 01/14/2022 Yes    Depression [F32.A] 01/14/2022 Yes    PTSD (post-traumatic stress disorder) [F43.10] 01/14/2022 Yes    Sleep apnea [G47.30] 01/14/2022 Yes     Chronic      Problems Resolved During this Admission:    Diagnosis Date Noted Date Resolved POA    ACS (acute coronary syndrome) [I24.9] 01/15/2022 01/15/2022 Yes     NSTEMI  -ACS protocol   -Plan for [facility] tomorrow  -2DE with normal EF and no WMA's    Afib  -currently in SR  -s/p  RFA    HTN  -stable    HLD  -statin    CAD   -per above    VTE Risk Mitigation (From admission, onward)         Ordered     enoxaparin injection 100 mg  Every 12 hours (non-standard times)         01/15/22 1127     IP VTE HIGH RISK PATIENT  Once         01/15/22 0015     Place sequential compression device  Until discontinued         01/15/22 0015                Thank you for your consult. I will follow-up with patient. Please contact us if you have any additional questions.    Rivas Conner MD  Cardiology   [place] - Telemetry

## 2022-01-17 NOTE — CONSULTS
Thank you for your consult to [facility]. We have reviewed the patient chart. This patient does meet criteria for [facility] service at this time. Will assume care on 01/17/22 at 7AM     Ayana Moulton MD  [facility]

## 2022-01-17 NOTE — PLAN OF CARE
Problem: Hypertension Comorbidity  Goal: Blood Pressure in Desired Range  Outcome: Ongoing, Progressing     Problem: Adult Inpatient Plan of Care  Goal: Optimal Comfort and Wellbeing  Outcome: Ongoing, Progressing     Problem: Adult Inpatient Plan of Care  Goal: Plan of Care Review  Outcome: Ongoing, Progressing

## 2022-01-17 NOTE — NURSING
Care plans reviewed. Patient has no c/o of SOB or chest pains. Patient went for a Echo showed 65% ejection fraction. Patient is NPO after midnight for Cardiac Cath tomorrow. Safety maintained bed in lowest position, bed wheels locked, bed alarm on, call light within reach. Will continue to monitor.

## 2022-01-18 VITALS
DIASTOLIC BLOOD PRESSURE: 59 MMHG | RESPIRATION RATE: 18 BRPM | HEIGHT: 72 IN | OXYGEN SATURATION: 97 % | HEART RATE: 71 BPM | TEMPERATURE: 98 F | WEIGHT: 228.81 LBS | SYSTOLIC BLOOD PRESSURE: 108 MMHG | BODY MASS INDEX: 30.99 KG/M2

## 2022-01-18 LAB
POC ACTIVATED CLOTTING TIME K: 279 SEC (ref 74–137)
POC ACTIVATED CLOTTING TIME K: 309 SEC (ref 74–137)
POCT GLUCOSE: 187 MG/DL (ref 70–110)
POCT GLUCOSE: 216 MG/DL (ref 70–110)
SAMPLE: ABNORMAL
SAMPLE: ABNORMAL

## 2022-01-18 PROCEDURE — 92928 PRQ TCAT PLMT NTRAC ST 1 LES: CPT | Mod: LC,,, | Performed by: INTERNAL MEDICINE

## 2022-01-18 PROCEDURE — 93010 ELECTROCARDIOGRAM REPORT: CPT | Mod: ,,, | Performed by: INTERNAL MEDICINE

## 2022-01-18 PROCEDURE — 25500020 PHARM REV CODE 255: Performed by: INTERNAL MEDICINE

## 2022-01-18 PROCEDURE — C1725 CATH, TRANSLUMIN NON-LASER: HCPCS | Performed by: INTERNAL MEDICINE

## 2022-01-18 PROCEDURE — 99153 MOD SED SAME PHYS/QHP EA: CPT | Performed by: INTERNAL MEDICINE

## 2022-01-18 PROCEDURE — C1753 CATH, INTRAVAS ULTRASOUND: HCPCS | Performed by: INTERNAL MEDICINE

## 2022-01-18 PROCEDURE — 99152 MOD SED SAME PHYS/QHP 5/>YRS: CPT | Performed by: INTERNAL MEDICINE

## 2022-01-18 PROCEDURE — 27201423 OPTIME MED/SURG SUP & DEVICES STERILE SUPPLY: Performed by: INTERNAL MEDICINE

## 2022-01-18 PROCEDURE — 99152 MOD SED SAME PHYS/QHP 5/>YRS: CPT | Mod: ,,, | Performed by: INTERNAL MEDICINE

## 2022-01-18 PROCEDURE — 92978 PR IVUS, CORONARY, 1ST VESSEL: ICD-10-PCS | Mod: 26,LC,, | Performed by: INTERNAL MEDICINE

## 2022-01-18 PROCEDURE — 85347 COAGULATION TIME ACTIVATED: CPT | Performed by: INTERNAL MEDICINE

## 2022-01-18 PROCEDURE — 93005 ELECTROCARDIOGRAM TRACING: CPT

## 2022-01-18 PROCEDURE — 99900035 HC TECH TIME PER 15 MIN (STAT)

## 2022-01-18 PROCEDURE — 93458 L HRT ARTERY/VENTRICLE ANGIO: CPT | Mod: 26,51,59, | Performed by: INTERNAL MEDICINE

## 2022-01-18 PROCEDURE — 93458 PR CATH PLACE/CORON ANGIO, IMG SUPER/INTERP,W LEFT HEART VENTRICULOGRAPHY: ICD-10-PCS | Mod: 26,51,59, | Performed by: INTERNAL MEDICINE

## 2022-01-18 PROCEDURE — 99152 PR MOD CONSCIOUS SEDATION, SAME PHYS, 5+ YRS, FIRST 15 MIN: ICD-10-PCS | Mod: ,,, | Performed by: INTERNAL MEDICINE

## 2022-01-18 PROCEDURE — 92928 PR STENT: ICD-10-PCS | Mod: LC,,, | Performed by: INTERNAL MEDICINE

## 2022-01-18 PROCEDURE — C1769 GUIDE WIRE: HCPCS | Performed by: INTERNAL MEDICINE

## 2022-01-18 PROCEDURE — C1894 INTRO/SHEATH, NON-LASER: HCPCS | Performed by: INTERNAL MEDICINE

## 2022-01-18 PROCEDURE — 92978 ENDOLUMINL IVUS OCT C 1ST: CPT | Mod: LC | Performed by: INTERNAL MEDICINE

## 2022-01-18 PROCEDURE — C1887 CATHETER, GUIDING: HCPCS | Performed by: INTERNAL MEDICINE

## 2022-01-18 PROCEDURE — 93458 L HRT ARTERY/VENTRICLE ANGIO: CPT | Mod: 51,59 | Performed by: INTERNAL MEDICINE

## 2022-01-18 PROCEDURE — 25000003 PHARM REV CODE 250: Performed by: NURSE PRACTITIONER

## 2022-01-18 PROCEDURE — 63600175 PHARM REV CODE 636 W HCPCS: Performed by: INTERNAL MEDICINE

## 2022-01-18 PROCEDURE — C9600 PERC DRUG-EL COR STENT SING: HCPCS | Mod: LC | Performed by: INTERNAL MEDICINE

## 2022-01-18 PROCEDURE — 94660 CPAP INITIATION&MGMT: CPT

## 2022-01-18 PROCEDURE — 93010 EKG 12-LEAD: ICD-10-PCS | Mod: ,,, | Performed by: INTERNAL MEDICINE

## 2022-01-18 PROCEDURE — 25000003 PHARM REV CODE 250: Performed by: INTERNAL MEDICINE

## 2022-01-18 PROCEDURE — 92978 ENDOLUMINL IVUS OCT C 1ST: CPT | Mod: 26,LC,, | Performed by: INTERNAL MEDICINE

## 2022-01-18 PROCEDURE — C1874 STENT, COATED/COV W/DEL SYS: HCPCS | Performed by: INTERNAL MEDICINE

## 2022-01-18 DEVICE — XIENCE SIERRA™ EVEROLIMUS ELUTING CORONARY STENT SYSTEM 3.00 MM X 12 MM / RAPID-EXCHANGE
Type: IMPLANTABLE DEVICE | Site: HEART | Status: FUNCTIONAL
Brand: XIENCE SIERRA™

## 2022-01-18 RX ORDER — IODIXANOL 320 MG/ML
INJECTION, SOLUTION INTRAVASCULAR
Status: DISCONTINUED | OUTPATIENT
Start: 2022-01-18 | End: 2022-01-18 | Stop reason: HOSPADM

## 2022-01-18 RX ORDER — SODIUM CHLORIDE 9 MG/ML
INJECTION, SOLUTION INTRAVENOUS CONTINUOUS
Status: ACTIVE | OUTPATIENT
Start: 2022-01-18 | End: 2022-01-18

## 2022-01-18 RX ORDER — LIDOCAINE HYDROCHLORIDE 10 MG/ML
INJECTION, SOLUTION EPIDURAL; INFILTRATION; INTRACAUDAL; PERINEURAL
Status: DISCONTINUED | OUTPATIENT
Start: 2022-01-18 | End: 2022-01-18 | Stop reason: HOSPADM

## 2022-01-18 RX ORDER — SODIUM CHLORIDE 9 MG/ML
INJECTION, SOLUTION INTRAVENOUS
Status: DISCONTINUED | OUTPATIENT
Start: 2022-01-18 | End: 2022-01-18 | Stop reason: HOSPADM

## 2022-01-18 RX ORDER — DIPHENHYDRAMINE HYDROCHLORIDE 50 MG/ML
INJECTION INTRAMUSCULAR; INTRAVENOUS
Status: DISCONTINUED | OUTPATIENT
Start: 2022-01-18 | End: 2022-01-18 | Stop reason: HOSPADM

## 2022-01-18 RX ORDER — HEPARIN SODIUM 1000 [USP'U]/ML
INJECTION, SOLUTION INTRAVENOUS; SUBCUTANEOUS
Status: DISCONTINUED | OUTPATIENT
Start: 2022-01-18 | End: 2022-01-18 | Stop reason: HOSPADM

## 2022-01-18 RX ORDER — ATORVASTATIN CALCIUM 40 MG/1
40 TABLET, FILM COATED ORAL DAILY
Qty: 90 TABLET | Refills: 0 | Status: SHIPPED | OUTPATIENT
Start: 2022-01-18 | End: 2022-01-18 | Stop reason: SDUPTHER

## 2022-01-18 RX ORDER — HEPARIN SODIUM 200 [USP'U]/100ML
INJECTION, SOLUTION INTRAVENOUS
Status: DISCONTINUED | OUTPATIENT
Start: 2022-01-18 | End: 2022-01-18 | Stop reason: HOSPADM

## 2022-01-18 RX ORDER — FENTANYL CITRATE 50 UG/ML
INJECTION, SOLUTION INTRAMUSCULAR; INTRAVENOUS
Status: DISCONTINUED | OUTPATIENT
Start: 2022-01-18 | End: 2022-01-18 | Stop reason: HOSPADM

## 2022-01-18 RX ORDER — MIDAZOLAM HYDROCHLORIDE 1 MG/ML
INJECTION, SOLUTION INTRAMUSCULAR; INTRAVENOUS
Status: DISCONTINUED | OUTPATIENT
Start: 2022-01-18 | End: 2022-01-18 | Stop reason: HOSPADM

## 2022-01-18 RX ORDER — ATORVASTATIN CALCIUM 40 MG/1
40 TABLET, FILM COATED ORAL DAILY
Qty: 30 TABLET | Refills: 1 | Status: SHIPPED | OUTPATIENT
Start: 2022-01-18 | End: 2022-02-17

## 2022-01-18 RX ORDER — CLOPIDOGREL BISULFATE 75 MG/1
75 TABLET ORAL DAILY
Qty: 30 TABLET | Refills: 11 | Status: SHIPPED | OUTPATIENT
Start: 2022-01-19 | End: 2023-01-19

## 2022-01-18 RX ADMIN — LISINOPRIL 10 MG: 10 TABLET ORAL at 08:01

## 2022-01-18 RX ADMIN — CLOPIDOGREL 75 MG: 75 TABLET, FILM COATED ORAL at 08:01

## 2022-01-18 RX ADMIN — ASPIRIN 81 MG: 81 TABLET, COATED ORAL at 08:01

## 2022-01-18 RX ADMIN — VENLAFAXINE 100 MG: 25 TABLET ORAL at 08:01

## 2022-01-18 RX ADMIN — METOPROLOL TARTRATE 25 MG: 25 TABLET, FILM COATED ORAL at 08:01

## 2022-01-18 RX ADMIN — ATORVASTATIN CALCIUM 40 MG: 40 TABLET, FILM COATED ORAL at 08:01

## 2022-01-18 NOTE — PROGRESS NOTES
[facility] Medicine  Telemedicine Progress Note    Patient Name: Hilario Vivar  MRN: [id number]  Patient Class: IP- Inpatient   Admission Date: 1/14/2022  Length of Stay: 3 days  Attending Physician: Ayana Moulton MD  Primary Care Provider: Arthur Cavazos MD          Subjective:     Principal Problem:NSTEMI (non-ST elevated myocardial infarction)        HPI:  Hilario Vivar is a 76 year old male with a PMHx of Hilario Vivar is a 76 y.o. male patient with a past medical history of CAD (AMI 1980's, proximal LAD PCI 1995, distal circ stents 2005), PAF/atrial flutter s/p ablation, HTN, HLD, Osteoarthritis and DM who presented to [place] ED ~ 9:30 am on 1/14/21 with complaints of chest pain, unrelieved by nitroglycerin SL X 3 and 4 81 mg Aspirin. The chest pain started about 4:30 am. Patient reports taking nitro and aspiring but with no relief of pain. Denies pain radiation reports substernal pain. Pain was reported 6/10 then decreased to 2/10 after morphine and  mg. While there he was noted to have VS (0627 h) HR 49, /67, RR 18, SpO2 96% (RA).  Labs:  CBC and CMP unremarkable except for Glu 235, troponin 0.006 > 0.065, BNP 22, COVID neg. EKG sinus bradycardia (48) with no acute changes, CXR clear. Patient given aspirin (324) and morphine. Over the next few h the pain improved (6 > 2).  Patient resting comfortably.  Transfer requested for higher level of care (cardiology).                       Overview/Hospital Course:  He was [place] to the [facility] [place] further care. Troponin was elevated but now declining. He was loaded with Plavix and started on full dose Lovenox. Cardiology was consulted. Planning LHC on tomorrow.      Interval History: Seen at the bedside--no distress noted. Appreciate Cardiology. Planning LHC on tomorrow. Denies any further chest pain.     Review of Systems   Constitutional: Negative for diaphoresis and fatigue.   HENT:  Negative for trouble swallowing.    Eyes: Negative for visual disturbance.   Respiratory: Negative for cough and shortness of breath.    Cardiovascular: Negative for chest pain.   Gastrointestinal: Negative for diarrhea, nausea and vomiting.   Genitourinary: Negative for difficulty urinating.   Musculoskeletal: Negative for gait problem and myalgias.   Neurological: Positive for weakness.   Hematological: Does not bruise/bleed easily.   Psychiatric/Behavioral: Negative for confusion.     Objective:     Vital Signs (Most Recent):  Temp: 97.6 °F (36.4 °C) (01/17/22 1948)  Pulse: 70 (01/17/22 1948)  Resp: 18 (01/17/22 1948)  BP: 119/65 (01/17/22 1948)  SpO2: 97 % (01/17/22 2108) Vital Signs (24h Range):  Temp:  [96.7 °F (35.9 °C)-98.3 °F (36.8 °C)] 97.6 °F (36.4 °C)  Pulse:  [63-84] 70  Resp:  [16-20] 18  SpO2:  [93 %-99 %] 97 %  BP: (107-131)/(53-79) 119/65     Weight: 103 kg (227 lb)  Body mass index is 30.79 kg/m².    Intake/Output Summary (Last 24 hours) at 1/17/2022 2228  Last data filed at 1/17/2022 1800  Gross per 24 hour   Intake 711 ml   Output --   Net 711 ml      Physical Exam  Vitals and nursing note reviewed.   Constitutional:       Appearance: He is obese.   HENT:      Head: Normocephalic and atraumatic.      Mouth/Throat:      Mouth: Mucous membranes are moist.   Eyes:      Extraocular Movements: Extraocular movements intact.      Conjunctiva/sclera: Conjunctivae normal.   Cardiovascular:      Rate and Rhythm: Normal rate and regular rhythm.      Pulses: Normal pulses.   Pulmonary:      Effort: Pulmonary effort is normal. No respiratory distress.   Abdominal:      General: There is no distension.      Tenderness: There is no abdominal tenderness.   Musculoskeletal:         General: Normal range of motion.      Cervical back: Normal range of motion.   Skin:     General: Skin is warm and dry.   Neurological:      Mental Status: He is alert and oriented to person, place, and time.   Psychiatric:          Mood and Affect: Mood normal.         Significant Labs: All pertinent labs within the past 24 hours have been reviewed.    Significant Imaging: I have reviewed all pertinent imaging results/findings within the past 24 hours.      Assessment/Plan:      * NSTEMI (non-ST elevated myocardial infarction)  Transferred from [place] for ACS work up  History of MI in 80s treated medically   Troponin 0.006-0.065-5.634  Received  mg and morphine PTA with some pain relief, EKG with Sinus bradycardia with 1st degree A-V block. Cannot rule out Anterior infarct  No current complaints of CP or SOB  With last troponin level increase repeat EKG with: Sinus rhythm with 1st degree A-V block with occasional Premature ventricular complexes. Possible Anterior infarct  -trend troponin  -trend EKG  -NPO at midnight  -ECHO pending  -appreciate cardiology  -loaded with Plavix  -full dose Lovenox        Sleep apnea  CPAP QHS      PTSD (post-traumatic stress disorder)  Depression    Takes prazosin, Effexor, Remeron, at home  Will resume      Depression        Type 2 diabetes mellitus, without long-term current use of insulin  Hemoglobin A1C   Date Value Ref Range Status   01/15/2022 7.6 (H) 4.0 - 5.6 % Final     Comment:     ADA Screening Guidelines:  5.7-6.4%  Consistent with prediabetes  >or=6.5%  Consistent with diabetes    High levels of fetal hemoglobin interfere with the HbA1C  assay. Heterozygous hemoglobin variants (HbS, HgC, etc)do  not significantly interfere with this assay.   However, presence of multiple variants may affect accuracy.       -A1C pending, SSI, accuchecks Q6 while NPO, diabetic diet once allowed to eat      HTN (hypertension)  Hypertensive on admission  Takes lisinopril, metoprolol at home-resume and monitor      Coronary artery disease involving native coronary artery  PCI proximal LAD in 1995, Coronary stents in distal circumflex 2005   Continue home ASA, coreg, statin        VTE Risk Mitigation (From  admission, onward)         Ordered     IP VTE HIGH RISK PATIENT  Once         01/15/22 0015     Place sequential compression device  Until discontinued         01/15/22 0015                      I have assessed these finding virtually using telemed platform and with assistance of bedside nurse                 The attending portion of this evaluation, treatment, and documentation was performed per Ayana Moulton MD via Telemedicine AudioVisual using the secure [employer] software platform with 2 way audio/video. The provider was located off-site and the patient is located in the hospital. The aforementioned video software was utilized to document the relevant history and physical exam    Ayana Moulton MD  Department of Hospital Medicine   [facility]

## 2022-01-18 NOTE — NURSING
Shannan --- tech from surgery reported that nurse Richa inspected right radial access site in handoff in pts room 472

## 2022-01-18 NOTE — CONSULTS
[facility] - Telemetry  Cardiology  Consult Note    Patient Name: Hilario Vivar  MRN: [id number]  Admission Date: 1/14/2022  Hospital Length of Stay: 3 days  Code Status: Full Code   Attending Provider: Ayana Moulton MD   Consulting Provider: PHONG Chase, ANP  Primary Care Physician: Arthur Cavazos MD  Principal Problem:NSTEMI (non-ST elevated myocardial infarction)      Inpatient consult to Cardiology-[facility]  Consult performed by: PHONG Brewster, ANP  Consult ordered by: Lizabeth Han NP        See full consult note dated 1/15/2022 by Dr. Rivas Conner

## 2022-01-18 NOTE — NURSING
Patient returned from the cath lab at 1155. Report received from [facility]. He had a left heart cath through the right radial, he had 1 stent placed in the distal LCX. Patient tolerated procedure well. He is getting 0.9 % NS at 150 mL/hr x 5 hours this will be completed at 1350. He is to have Vitals every hour x 4 hours with neuro checks and site check. Patient is in bed resting. Will continue to monitor.

## 2022-01-18 NOTE — NURSING
Remaining air removed from right radial vasc band. Gauze and tegaderm dressing applied c.d.i. No drainage or shadowing noted. No bleeding or hematoma noted around site. +2 reid radial pulses palpated. Skin normal in color, warm to touch, < 3 sec cap refill. Pt tolerated well.  Will continue to monitor pt. Post op instructions reviewed with pt and verbalizes understanding; tele box at bedside; pt has no complaints and resting quietly in bed

## 2022-01-18 NOTE — PROGRESS NOTES
01/18/22 1543   AVS Confirmation   Discharge instructions and AVS given to and reviewed with patient and/or significant other. Yes   Discharge orders noted. AVS prepared with medication list, importance of medication compliance, follow up appointments, diet, home care instructions, treatment plan, self management, and when to seek medical attention. Detailed clinical reference list attached. AVS printed and handed to patient by bedside nurse. VN reviewed discharge instructions with patient using teachback method.  Allowed time for questions, all questions answered.  Patient verbalized complete understanding of discharge instructions and did ask about the possibility of setting up a virtual visit for cardiology follow up.  and bedside nurse notified. They will reprint AVS is appts are changed.   Discharge instructions complete.  Bedside delivery complete.

## 2022-01-18 NOTE — DISCHARGE INSTRUCTIONS
Understanding Transradial Cardiac Catheterization    Cardiac catheterization (cardiac cath) is a common, non-surgical procedure. During the procedure, your doctor will insert a long, thin tube (catheter) into an artery and move it up into your heart. Transradial means the catheter is inserted into an artery in the wrist (the radial artery). This procedure can be used to diagnose and treat certain heart problems.  Why do I need a transradial cardiac cath?  You may need a cardiac cath if signs indicate a problem with your heart. These may include:  · Symptoms of chest pain, tightness, or heaviness (known as angina). This is a common symptom of blocked heart arteries, known as coronary artery disease.  · Symptoms of weakness, dizziness, trouble breathing, or swollen legs or feet. These may be symptoms of a problem with a heart valve or the heart muscle.  · Other test results show heart problems. Tests may include stress tests, heart scans, and echocardiography.  During a cardiac cath, your doctor can see the condition of the coronary arteries and heart valves. He or she can also check how well the heart pumps and the flow of blood through the heart. Your doctor can also measure pressures and take blood samples. And, if needed, he or she can open blocked arteries. This can help reduce symptoms of angina.  Cardiac cath is often done using a catheter inserted into an artery in the groin. During transradial cardiac cath, the catheter is inserted into an artery in the wrist. This can mean less bleeding and a faster recovery. Some people may have blockages in the groin arteries as well as in the heart arteries, making it difficult to reach the heart. The transradial approach can be used to get around this problem.   What happens during a transradial cardiac cath?  The procedure is done in the hospital or a surgery center. First, an IV line is put in your arm or hand to deliver fluids and medicines. You will likely be given  medicine to relax you and make you drowsy. When the procedure begins:  · You lie on an X-ray table.  · The skin over the insertion site in your wrist is numbed.  · The doctor makes a tiny puncture or incision into the artery in the wrist. He or she then inserts a catheter and threads it through the blood vessel into your heart.    · The doctor may inject a contrast fluid through the catheter into the arteries. This fluid makes the arteries show up better on X-rays.  · Tests may be done to check the condition of your heart and arteries. If needed, the doctor can clear blockages in the arteries or do other repairs.  · When the doctor is finished, he or she will remove the catheter and put direct pressure on the site to prevent bleeding.  · You will stay for a time to recover, and then go home.  What are the risks of transradial cardiac cath?  These include:  · Bleeding, bruising, infection, or blood clots  · Damage to the radial artery that may cause injury to the hand  · Allergic reaction to the contrast fluid  · Abnormal heartbeat (arrhythmia)  · Damage to blood vessels or tissues  · Kidney damage or failure  · The need for emergency heart surgery  · Heart attack, stroke, or death  Date Last Reviewed: 5/1/2016  © 5069-8118 [employer]. [address] [place] [address], [place] [id number]. All rights reserved. This information is not intended as a substitute for professional medical care. Always follow your healthcare professional's instructions.Discharge Instructions:    Do not drive a car, operate heavy equipment, care for a young child, etc for the next 12-24 hours.   Avoid drinking alcohol for 24 hours.  Do not make any important decisions for 24 hours.    Drink fluids to keep hydrated. Resume your usual diet as tolerated.     Rest for today then activity as tolerated.   Do not lift anything over 5 pounds for the first 3 days after procedure.    Remove dressing tomorrow then may shower with warm soapy water.  Do not scrub site. Pat dry.   May apply bandaid for 2 days.  No tub baths.  Do not submerge wound in water for 3 days.     Call MD for any unrelieved pain, excessive nausea or vomiting, redness around site, bleeding, or pus or foul smelling drainage, or any other questions or concerns.    Go to the ER for any difficulty breathing or chest pain.      If site swells or bleeds, hold direct pressure to area for 10 full minutes.   If site continues to bleed, continue to hold pressure to site and have someone bring you to the ER.Patient Education       Coronary Stenting Discharge Instructions   About this topic   You had a coronary stent placed in a vessel that takes blood to your heart muscle. You may have had a heart attack, heart failure, or chest pain before the procedure. This means you had a narrowed or blocked blood vessel. A stent will help keep the blood vessel open.       What care is needed at home?   · Ask your doctor what you need to do when you go home. Make sure you ask questions if you do not understand what the doctor says. This way you will know what you need to do.  · Talk to your doctor about how to care for your cut site. Ask your doctor about:  ? When you should change your bandages  ? When you may take a bath or shower  ? If you need to be careful with lifting things over 10 pounds (4.5 kg)  ? If you can walk up and down stairs  ? When you can exercise  ? When you may go back to your normal activities like work, driving, or sex  · Be sure to wash your hands before and after touching your wound or dressing.     What follow-up care is needed?   · Your doctor may ask you to make visits to the office to check on your progress. Be sure to keep these visits. You may need more tests or a change in your drugs.  · If you have stitches that do not dissolve or staples, you will need to have them removed. Your doctor will want to do this in 1 to 2 weeks. If the doctor used skin glue, the glue will fall off on  its own.  · Your doctor may order a heart rehab program for you after you leave the hospital. This is an important part of your care. Share your discharge information with the rehab staff so they can plan a program to help you recover. Let your doctor know if you need help finding a program.  What drugs may be needed?   Take your drugs as ordered by your doctor. Do not skip doses or stop taking your drugs. You should keep taking them, even if you feel better. Also, avoid taking any other drugs unless you have already checked them with your doctor. These include over-the-counter (OTC) drugs and herbal supplements. Always check with your doctor first.  The doctor may order drugs to:  · Thin the blood  · Help with pain  · Help your heart  · Lower your blood pressure  · Lower your cholesterol  · Help get rid of extra fluid  · Help you stop smoking  Will physical activity be limited?   You may have to limit your activity. Talk to your doctor about the right amount of activity for you.  What changes to diet are needed?   Eat a heart healthy diet. Ask your doctor or dietitian about a proper food plan. Stay away from fatty foods and fast food.  What problems could happen?   · Internal bleeding or bleeding at the cut site  · Damage to the blood vessel  · Allergic reaction or kidney failure from the dye used  · Blood clot in the stent  · Irregular heartbeat  · Heart attack  · Stroke  · Death  · The arteries become blocked again. This is called restenosis.  What can be done to prevent this health problem?   These may help keep your heart problem from getting worse:  · Keep a healthy weight.  · Do not smoke or use other tobacco products.  · Work with your doctor to control your cholesterol level and blood pressure.  · Eat a healthy diet low in saturated fats and salt.  · Limit how much beer, wine, and mixed drinks (alcohol) you drink.  · Exercise as advised by your doctor.  · If you have diabetes, control your blood sugars with  diet, exercise, drugs, and insulin as directed by your doctor.  · Manage stress through techniques like meditation, yoga, or zakiya chi.  · Ask your doctor what else you can do or change to live a healthier life.  When do I need to call the doctor?   Activate the emergency medical system right away if you have signs of a heart attack or stroke. Call 911 in the [place] or [place]. The sooner treatment begins, the better your chances for recovery. Call for emergency help right away if you have:  · Signs of heart attack:  ? Chest pain  ? Trouble breathing  ? Fast heartbeat  ? Feeling dizzy  · Signs of stroke:  ? Sudden numbness or weakness of the face, arm, or leg, especially on one side of the body  ? Sudden confusion, trouble speaking or understanding  ? Sudden trouble seeing in one or both eyes  ? Sudden trouble walking, dizziness, loss of balance or coordination  ? Sudden severe headache with no known cause  Call your doctor if you have:  · Signs of infection. These include fever of 100.4°F (38°C) or higher, chills.  · Signs of wound infection. These include swelling, redness, warmth around the wound; too much pain when touched; yellowish, greenish, or bloody discharge; foul smell coming from the cut site; cut site opens up.  · Arm, hand, leg, or foot is numb, painful, swollen, cool, or looks a different color  · Fainting or feel very tired  Teach Back: Helping You Understand   The Teach Back Method helps you understand the information we are giving you. After you talk with the staff, tell them in your own words what you learned. This helps to make sure the staff has described each thing clearly. It also helps to explain things that may have been confusing. Before going home, make sure you can do these:  · I can tell you about my procedure.  · I can tell you how to care for my cut site.  · I can tell you what I will do if I have signs of a heart attack or stroke.  Where can I learn more?   American Academy of  Family Physicians  https://familydoctor.org/coronary-artery-disease-cad-stents/   NHS Choices  http://www.nhs.uk/conditions/coronary-angioplasty/pages/introduction.aspx   Last Reviewed Date   2020-01-31  Consumer Information Use and Disclaimer   This information is not specific medical advice and does not replace information you receive from your health care provider. This is only a brief summary of general information. It does NOT include all information about conditions, illnesses, injuries, tests, procedures, treatments, therapies, discharge instructions or life-style choices that may apply to you. You must talk with your health care provider for complete information about your health and treatment options. This information should not be used to decide whether or not to accept your health care providers advice, instructions or recommendations. Only your health care provider has the knowledge and training to provide advice that is right for you.  Copyright   Copyright © 2021 [employer]Date, Inc. and its affiliates and/or licensors. All rights reserved.  Patient Education       Procedural Sedation, Adult ED   General Information   You came to the Emergency Department (ED) and received sedation to decrease the pain you felt during a procedure. The doctors used several medicines to do this. You may not remember much about the procedure because of the sedation. It takes a little while for your body to get rid of the medicines as you start to wake up. The staff in the ED watched you for a while after your procedure and feel it is safe for you to go home now.  What care is needed at home?   · Call your regular doctor to let them know that you were in the ED. Make a follow-up appointment if you were told to do so.  · It is good to have a family member or trusted friend stay with you for a few hours after you get home.  · You may throw up because of the medicine. If this happens, try taking small sips of clear liquids like  water, sports drinks, or ginger ale.  · Eat light foods until the medicine wears off and you feel like yourself. Start with saltine or shanon crackers, popsicles, toast, or other bland foods. Avoid greasy, spicy, or junk foods like chips, pizza, or fast food.  · For at least 24 hours:  ? Do not drive a car or operate machinery.  ? Do not make any major decisions or sign important papers. You may not be thinking clearly.  ? Avoid alcohol.  ? Take extra care when moving about. You are at a higher risk for falling.  When do I need to get emergency help?   · Call for an ambulance right away if:   ? You start having severe trouble breathing or swallowing.  · Return to the ED If:   ? You feel so tired you cannot stay awake.  ? You speak with a slur so that others cannot understand you.  ? You have an upset stomach and throw up more than 3 times.  When do I need to call the doctor?   · You start to feel dizzy, light-headed, or weak.  · You have new or worsening problems.  Last Reviewed Date   2020-09-22  Consumer Information Use and Disclaimer   This information is not specific medical advice and does not replace information you receive from your health care provider. This is only a brief summary of general information. It does NOT include all information about conditions, illnesses, injuries, tests, procedures, treatments, therapies, discharge instructions or life-style choices that may apply to you. You must talk with your health care provider for complete information about your health and treatment options. This information should not be used to decide whether or not to accept your health care providers advice, instructions or recommendations. Only your health care provider has the knowledge and training to provide advice that is right for you.  Copyright   Copyright © 2021 UpToDate, Inc. and its affiliates and/or licensors. All rights reserved.

## 2022-01-18 NOTE — NURSING
Handoff telephone report to nurse Chaudhry for pts room # [id number]; ekg tech to bedside; pt is resting quietly in bed

## 2022-01-18 NOTE — INTERVAL H&P NOTE
The patient has been examined and the H&P has been reviewed:    I concur with the findings and no changes have occurred since H&P was written.    Anesthesia/Surgery risks, benefits and alternative options discussed and understood by patient/family.          Active Hospital Problems    Diagnosis  POA    *NSTEMI (non-ST elevated myocardial infarction) [I21.4]  Yes     Coronary artery disease                         Myocardial infarction in the 80's treated medically                         PCI proximal LAD in 1995                         Coronary stents in distal circumflex 2005   Nuclear stress test May 2013: No ischemia, 10 METS, EF 50%   Nuclear stress test November 2016: EF 65%, no ischemia          Coronary artery disease involving native coronary artery [I25.10]  Yes     Chronic    HTN (hypertension) [I10]  Yes    Type 2 diabetes mellitus, without long-term current use of insulin [E11.9]  Yes    Depression [F32.A]  Yes    PTSD (post-traumatic stress disorder) [F43.10]  Yes    Sleep apnea [G47.30]  Yes     Chronic      Resolved Hospital Problems    Diagnosis Date Resolved POA    ACS (acute coronary syndrome) [I24.9] 01/15/2022 Yes

## 2022-01-18 NOTE — SUBJECTIVE & OBJECTIVE
Interval History: Seen at the bedside--no distress noted. Appreciate Cardiology. Planning [facility] on tomorrow. Denies any further chest pain.     Review of Systems   Constitutional: Negative for diaphoresis and fatigue.   HENT: Negative for trouble swallowing.    Eyes: Negative for visual disturbance.   Respiratory: Negative for cough and shortness of breath.    Cardiovascular: Negative for chest pain.   Gastrointestinal: Negative for diarrhea, nausea and vomiting.   Genitourinary: Negative for difficulty urinating.   Musculoskeletal: Negative for gait problem and myalgias.   Neurological: Positive for weakness.   Hematological: Does not bruise/bleed easily.   Psychiatric/Behavioral: Negative for confusion.     Objective:     Vital Signs (Most Recent):  Temp: 97.6 °F (36.4 °C) (01/17/22 1948)  Pulse: 70 (01/17/22 1948)  Resp: 18 (01/17/22 1948)  BP: 119/65 (01/17/22 1948)  SpO2: 97 % (01/17/22 2108) Vital Signs (24h Range):  Temp:  [96.7 °F (35.9 °C)-98.3 °F (36.8 °C)] 97.6 °F (36.4 °C)  Pulse:  [63-84] 70  Resp:  [16-20] 18  SpO2:  [93 %-99 %] 97 %  BP: (107-131)/(53-79) 119/65     Weight: 103 kg (227 lb)  Body mass index is 30.79 kg/m².    Intake/Output Summary (Last 24 hours) at 1/17/2022 2228  Last data filed at 1/17/2022 1800  Gross per 24 hour   Intake 711 ml   Output --   Net 711 ml      Physical Exam  Vitals and nursing note reviewed.   Constitutional:       Appearance: He is obese.   HENT:      Head: Normocephalic and atraumatic.      Mouth/Throat:      Mouth: Mucous membranes are moist.   Eyes:      Extraocular Movements: Extraocular movements intact.      Conjunctiva/sclera: Conjunctivae normal.   Cardiovascular:      Rate and Rhythm: Normal rate and regular rhythm.      Pulses: Normal pulses.   Pulmonary:      Effort: Pulmonary effort is normal. No respiratory distress.   Abdominal:      General: There is no distension.      Tenderness: There is no abdominal tenderness.   Musculoskeletal:         General:  Normal range of motion.      Cervical back: Normal range of motion.   Skin:     General: Skin is warm and dry.   Neurological:      Mental Status: He is alert and oriented to person, place, and time.   Psychiatric:         Mood and Affect: Mood normal.         Significant Labs: All pertinent labs within the past 24 hours have been reviewed.    Significant Imaging: I have reviewed all pertinent imaging results/findings within the past 24 hours.

## 2022-01-18 NOTE — ASSESSMENT & PLAN NOTE
Transferred from [facility] for ACS work up  History of MI in 80s treated medically   Troponin 0.006-0.065-5.634  Received  mg and morphine PTA with some pain relief, EKG with Sinus bradycardia with 1st degree A-V block. Cannot rule out Anterior infarct  No current complaints of CP or SOB  With last troponin level increase repeat EKG with: Sinus rhythm with 1st degree A-V block with occasional Premature ventricular complexes. Possible Anterior infarct  -trend troponin  -trend EKG  -NPO at midnight  -ECHO pending  -appreciate cardiology  -loaded with Plavix  -full dose Lovenox

## 2022-01-18 NOTE — NURSING
Patient to cath recovery per stretcher with nurse torres; bedside report received; pt connected to monitor, pulse ox and bp cuff left arm; pt has right radial vasc band in place w/o any bleeding nor swelling; afebrile; no pain reported; skin wm dry color pk; post teaching begun; pts spouse in waiting room and included in education and stent card given to pts spouse in waiting room; radial and dp pulses palpable bilat; fall socks in place; ns infusing at 150 ml per hour and placed on iv pump; rails up x 2; pt has no belongings at bedside; p[ts chart and tele box at bedside

## 2022-01-18 NOTE — PLAN OF CARE
Problem: Adult Inpatient Plan of Care  Goal: Plan of Care Review  Outcome: Ongoing, Progressing   Patient is alert, oriented X4. Care plan explained to patient, he verbalized understanding.     On room air, O2 saturation maintain 97%, no respiratory distress noted. Wearing CPAP during the night. On cardiac monitor, running normal sinus rhythm.     Deny nausea/vomiting/diarrhea. No chest pain complaint. Due medications given. Educated patient NPO will start at the midnight, he verbalized understanding.     Chlorhexidine bath done. Clean grown changed. SCD applied in the morning      Maintain fall risk precaution, bed in lowest position, bed alarm on. Urinal at the bedside. Call light/personal items in reach. Instructed patient call for help as needed. Will continue to monitor.

## 2022-01-18 NOTE — BRIEF OP NOTE
POST CATH NOTE    HPI:   s/p catheterization secondary to:  NSTEMI     Cath Results:  Access: Rt Radial   LM:   Very short, almost separate ostia   LAD: CLARIBEL mild to moderate diffuse disease, D1 40% prox   LCx:   Patent stents with 70% ISr in distal stent s/p PTCA and stent   RCA: CLARIBEL- flow mild diffuse disease   LVgram: LVEDP 9, LVEF 55 %    Intervention:     S/p successful PTCA and stent to distal LCX with 3.0x 12 LILIANA post dilated with 3.5 mm NC balloon     Closure device:  Vasc band     Patient tolerated procedure well, no complications    Post Cath Exam:  /60 (BP Location: Right arm, Patient Position: Lying)   Pulse 79   Temp 97.2 °F (36.2 °C) (Oral)   Resp 18   Ht 6' (1.829 m)   Wt 103.8 kg (228 lb 13.4 oz)   SpO2 (!) 94%   BMI 31.04 kg/m²     Assessment:   - s/p successful PTCA and stent to LCX ISR    Plan:  - ASA/Plavix for 12 months at least   - Statin tx  - OK to discharge home after 6 hours from cardiac standpoint. Please make sure he has ASA and Plavix  rx at discharge.

## 2022-01-18 NOTE — PLAN OF CARE
MAIRA completed assessment with pt's spouse Marilee Vivar [phone] telephonically. Pt's spouse Marilee reported the patient is independent with ADL's and does not utilize DME. Patient is currently traveling with spouse from [place], [place]. Pt drives and spouse reports no difficulties affording medication. [facility] Pt's spouse Marilee Vivar will provide transportation following discharge and reported no anticipated discharge needs at this time. SW encouraged pt's spouse to call with any questions or concerns. Pt's spouse verbalized understanding. Case management will continue to follow pt's transition of care.       Future Appointments   Date Time Provider Department Center   1/21/2022 12:30 PM Elyssa Jackson MD [facility] [place]          01/18/22 1457   Discharge Assessment   Assessment Type Discharge Planning Assessment   Confirmed/corrected address, phone number and insurance Yes   Confirmed Demographics Correct on Facesheet   Source of Information family  (Marilee Vivar [phone] ( Spouse))   If unable to respond/provide information was family/caregiver contacted? Yes   Contact Name/Number Spouse  Marilee Vivar [phone]   When was your last doctors appointment?   (Pt's spouse reported pt's last MD visit was in November)   Does patient/caregiver understand observation status Yes   Reason For Admission NSTEMI   Lives With spouse  (Marilee Vivar [phone] ( Spouse))   Facility Arrived From: Home   Do you expect to return to your current living situation? Yes   Do you have help at home or someone to help you manage your care at home? Yes   Who are your caregiver(s) and their phone number(s)? Spouse Marilee Vivar  [phone]   Prior to hospitilization cognitive status: Alert/Oriented   Current cognitive status: Alert/Oriented   Walking or Climbing Stairs Difficulty none   Dressing/Bathing Difficulty none   Home Layout Able to live on 1st floor   Equipment  Currently Used at Home none   Patient currently being followed by outpatient case management? No   Do you currently have service(s) that help you manage your care at home? No   Do you take prescription medications? Yes   Do you have prescription coverage? Yes   Coverage Medicare Part A&B  & [employer] Supplement   Do you have any problems affording any of your prescribed medications? No  (Pt's spouse reported [facility])   Is the patient taking medications as prescribed? yes   Who is going to help you get home at discharge? Spouse Marilee Ghazala [phone]   How do you get to doctors appointments? car, drives self   Are you on dialysis? No   Do you take coumadin? No   Discharge Plan A Home with family   DME Needed Upon Discharge  none   Discharge Barriers Identified None   Relationship/Environment   Name(s) of Who Lives With Patient Marilee Walkernytony [phone] ( Mother)

## 2022-01-18 NOTE — NURSING
Patient to room per stretcher on tele box monitor with Shannan--- tech from surgery; pt aao; pt wearing blue surg mask; pt has no pain and sr on monitor; pt has no belongings; chart with pt; right radial access site soft and no bleeding nor swelling w/ brisk cap refill good sensation and palpable radial pulse; ivf patent of ns and continued to the floor; iv access patent left arm; drsg to right radial access site cdi

## 2022-01-18 NOTE — PLAN OF CARE
Future Appointments   Date Time Provider Department Center   1/21/2022 12:30 PM Elyssa Jackson MD [facility]   1/27/2022  1:20 PM Melly Bolivar MD [facility]

## 2022-01-18 NOTE — NURSING
Patient discharged home with his wife. All discharge instructions reviewed with VN. Telemetry and IV removed. Patient tolerated well.

## 2022-01-20 LAB — CATH EF ESTIMATED: 60 %

## 2022-01-21 ENCOUNTER — OFFICE VISIT (OUTPATIENT)
Dept: PRIMARY CARE CLINIC | Facility: CLINIC | Age: 77
End: 2022-01-21
Payer: MEDICARE

## 2022-01-21 DIAGNOSIS — I21.4 NSTEMI (NON-ST ELEVATED MYOCARDIAL INFARCTION): Primary | ICD-10-CM

## 2022-01-21 DIAGNOSIS — E11.65 TYPE 2 DIABETES MELLITUS WITH HYPERGLYCEMIA, WITHOUT LONG-TERM CURRENT USE OF INSULIN: ICD-10-CM

## 2022-01-21 PROCEDURE — 99442 PR PHYSICIAN TELEPHONE EVALUATION 11-20 MIN: CPT | Mod: 95,,, | Performed by: INTERNAL MEDICINE

## 2022-01-21 PROCEDURE — 99442 PR PHYSICIAN TELEPHONE EVALUATION 11-20 MIN: ICD-10-PCS | Mod: 95,,, | Performed by: INTERNAL MEDICINE

## 2022-01-21 NOTE — PROGRESS NOTES
[facility]   New Visit Progress Note   Recent Hospital Discharge     PRESENTING HISTORY     Chief Complaint/Reason for Admission:  Follow up Hospital Discharge   PCP: Arthur Cavazos MD    History of Present Illness:  Mr. Hilario Vivar is a 76 y.o. male who was recently admitted to the hospital.    [facility]  Discharge Summary      Patient Name: Hilario Vivar  MRN: [id number]  Patient Class: IP- Inpatient  Admission Date: 1/14/2022  Discharge Date: 1/18/22  Attending Physician: Patsy Burkett*   Primary Care Provider: Arthur Cavazos MD  _________________________________________________________________    Today:  Presents to [facility] for initial hospital follow up.  Recently hospitalized for NSTEMI.  Patient with know CAD and prior stents.  Admitted to [facility].  Seen in consultation with Cardiology team.  Under went [facility] via R radial on 1/18/22.   LILIANA to LCX.   Recommendation DAPT with Aspirin/Plavix x 12 months.   Patient responded well to above intervention and supportive care.  Discharged to home.     Audio only visit today due to patient not having portal access.  Patient a resident of [place], traveling with wife through [place] when he experienced above event.   He has remained in [place] for his recovery and they will resume their trip in a few days.   They have an RV and are on an extended road trip.   Patient reports compliance with all medication.  Has 30 day supply Plavix and [facility]  His step daughter will mail the refills to him.   Has all necessary diabetic supplies.  CPG elevated above baseline since hospital discharge.   No recurrence of chest pain since hospital discharge.     Review of Systems  General ROS: negative for chills, fever or weight loss  Psychological ROS: negative for hallucination, depression or suicidal ideation  Ophthalmic ROS: negative for blurry vision, photophobia or eye pain  ENT  ROS: negative for epistaxis, sore throat or rhinorrhea  Respiratory ROS: no cough, shortness of breath, or wheezing  Cardiovascular ROS: no chest pain or dyspnea on exertion  Gastrointestinal ROS: no abdominal pain, change in bowel habits, or black/ bloody stools  Genito-Urinary ROS: no dysuria, trouble voiding, or hematuria  Musculoskeletal ROS: negative for gait disturbance or muscular weakness  Neurological ROS: no syncope or seizures; no ataxia  Dermatological ROS: negative for pruritis, rash and jaundice      PAST HISTORY:     Past Medical History:   Diagnosis Date    AF (paroxysmal atrial fibrillation)     Atrial flutter     Coronary artery disease     Diabetes mellitus     Hypertension     Myocardial infarction     Osteoarthritis        Past Surgical History:   Procedure Laterality Date    CORONARY ANGIOGRAPHY N/A 1/18/2022    Procedure: ANGIOGRAM, CORONARY ARTERY;  Surgeon: Alfredito Kay MD;  Location: [facility] CATH LAB/EP;  Service: Cardiology;  Laterality: N/A;    LEFT HEART CATHETERIZATION N/A 1/18/2022    Procedure: Left heart cath;  Surgeon: Alfredito Kay MD;  Location: [facility] CATH LAB/EP;  Service: Cardiology;  Laterality: N/A;       No family history on file.      MEDICATIONS & ALLERGIES:     Current Outpatient Medications on File Prior to Visit   Medication Sig Dispense Refill    acetaminophen (TYLENOL) 500 MG tablet Take 500 mg by mouth every 4 (four) hours as needed.      aspirin (ECOTRIN) 81 MG EC tablet Take 81 mg by mouth once daily.      atorvastatin (LIPITOR) 40 MG tablet Take 1 tablet (40 mg total) by mouth once daily. 30 tablet 1    canagliflozin (INVOKANA) 300 mg Tab tablet Take 1 tablet by mouth once daily.      cholecalciferol, vitamin D3, (VITAMIN D3) 50 mcg (2,000 unit) Cap Take by mouth.      clopidogreL (PLAVIX) 75 mg tablet Take 1 tablet (75 mg total) by mouth once daily. 30 tablet 11    coenzyme Q10 200 mg capsule Take 200 mg by mouth.      docosahexaenoic  acid-epa 120-180 mg Cap Take 1,000 mg by mouth.      doxepin (SINEQUAN) 10 MG capsule Take 10 mg by mouth.      glipiZIDE (GLUCOTROL) 10 MG tablet Take 10 mg by mouth 2 (two) times daily before meals.      INV doxazosin (CARDURA) 8 MG Tab Take 4 mg by mouth once daily. FOR INVESTIGATIONAL USE ONLY      INVOKANA 300 mg Tab tablet       lisinopriL 10 MG tablet Take 10 mg by mouth once daily.      magnesium oxide (MAG-OX) 400 mg (241.3 mg magnesium) tablet Take 400 mg by mouth once daily.      meloxicam (MOBIC) 15 MG tablet Take 15 mg by mouth once daily.      metFORMIN (GLUCOPHAGE) 1000 MG tablet Take 1,000 mg by mouth 2 (two) times daily with meals.      metoprolol tartrate (LOPRESSOR) 25 MG tablet Take 25 mg by mouth.      mirtazapine (REMERON) 30 MG tablet Take 30 mg by mouth.      multivitamin (MULTIPLE VITAMINS DAILY ORAL) Take by mouth.      omega-3 fatty acids 1,000 mg Cap Take 2 g by mouth.      omeprazole (PRILOSEC) 20 MG capsule Take 1 capsule by mouth once daily.      prazosin (MINIPRESS) 5 MG capsule Take 5 mg by mouth every evening.      tamsulosin HCl (FLOMAX ORAL) Take by mouth.      venlafaxine (EFFEXOR) 100 MG Tab Take by mouth 2 (two) times daily.      VITAMIN B COMPLEX ORAL Take by mouth.      vitamin D (VITAMIN D3) 1000 units Tab Take by mouth.       No current facility-administered medications on file prior to visit.        Review of patient's allergies indicates:  No Known Allergies    OBJECTIVE:     Wt Readings from Last 3 Encounters:   01/18/22 0546 103.8 kg (228 lb 13.4 oz)   01/17/22 1009 103 kg (227 lb)   01/14/22 2358 103 kg (227 lb 1.2 oz)   01/14/22 0629 108.9 kg (240 lb)   01/14/22 1817 108.9 kg (240 lb)       Laboratory  Lab Results   Component Value Date    WBC 6.72 01/17/2022    HGB 14.9 01/17/2022    HCT 44.1 01/17/2022    MCV 87 01/17/2022     01/17/2022     BMP  Lab Results   Component Value Date     01/17/2022    K 4.2 01/17/2022     01/17/2022     CO2 21 (L) 01/17/2022    BUN 20 01/17/2022    CREATININE 0.9 01/17/2022    CALCIUM 8.9 01/17/2022    ANIONGAP 13 01/17/2022    ESTGFRAFRICA >60 01/17/2022    EGFRNONAA >60 01/17/2022     Lab Results   Component Value Date    ALT 36 01/15/2022    AST 44 (H) 01/15/2022    ALKPHOS 60 01/15/2022    BILITOT 0.3 01/15/2022     Lab Results   Component Value Date    INR 1.0 01/15/2022    INR 1.0 01/14/2022     Lab Results   Component Value Date    HGBA1C 7.6 (H) 01/15/2022       Diagnostic Results:    2 D echo 1/7/22:  · The left ventricle is normal in size with concentric remodeling and normal systolic function.  · The estimated ejection fraction is 65%.  · Indeterminate left ventricular diastolic function.  · With normal right ventricular systolic function.  · Intermediate central venous pressure (8 mmHg).    Cath Results:  Access: Rt Radial   LM:   Very short, almost separate ostia   LAD: CLARIBEL mild to moderate diffuse disease, D1 40% prox   LCx:   Patent stents with 70% ISr in distal stent s/p PTCA and stent   RCA: CLARIBEL- flow mild diffuse disease   LVgram: LVEDP 9, LVEF 55 %     Intervention:      S/p successful PTCA and stent to distal LCX with 3.0x 12 LILIANA post dilated with 3.5 mm NC balloon      Closure device:  Vasc band      Patient tolerated procedure well, no complications    ASSESSMENT & PLAN:       NSTEMI (non-ST elevated myocardial infarction)  - continue current medication regimen  - follow up with primary cardiologist, Dr Arthur Cavazos, in [place]  - call my office with any issues, aspecifically if needing assistance with Plavix refill   - keep upcoming cardiology appt with Dr Bolivar 1/27/22 if still in this area      Type 2 diabetes mellitus with hyperglycemia, without long-term current use of insulin  - unknown HgBA1C  - self reported elevated CPG  - defer diabetic management to his primary team         Instructions for the patient:      Scheduled Follow-up :  Future Appointments   Date Time Provider  Department Center   1/21/2022 12:30 PM Elyssa Jackson MD [facility]   1/27/2022  1:20 PM Melly Bolivar MD [facility]       Post Visit Medication List:     Medication List          Accurate as of January 21, 2022  1:06 PM. If you have any questions, ask your nurse or doctor.            CONTINUE taking these medications    acetaminophen 500 MG tablet  Commonly known as: TYLENOL     aspirin 81 MG EC tablet  Commonly known as: ECOTRIN     atorvastatin 40 MG tablet  Commonly known as: LIPITOR  Take 1 tablet (40 mg total) by mouth once daily.     * canagliflozin 300 mg Tab tablet  Commonly known as: INVOKANA     * INVOKANA 300 mg Tab tablet  Generic drug: canagliflozin     * cholecalciferol (vitamin D3) 50 mcg (2,000 unit) Cap  Commonly known as: VITAMIN D3     * vitamin D 1000 units Tab  Commonly known as: VITAMIN D3     clopidogreL 75 mg tablet  Commonly known as: PLAVIX  Take 1 tablet (75 mg total) by mouth once daily.     coenzyme Q10 200 mg capsule     docosahexaenoic acid-epa 120-180 mg Cap     doxepin 10 MG capsule  Commonly known as: SINEQUAN     FLOMAX ORAL     glipiZIDE 10 MG tablet  Commonly known as: GLUCOTROL     INV doxazosin 8 MG Tab  Commonly known as: CARDURA     lisinopriL 10 MG tablet     magnesium oxide 400 mg (241.3 mg magnesium) tablet  Commonly known as: MAG-OX     meloxicam 15 MG tablet  Commonly known as: MOBIC     metFORMIN 1000 MG tablet  Commonly known as: GLUCOPHAGE     metoprolol tartrate 25 MG tablet  Commonly known as: LOPRESSOR     mirtazapine 30 MG tablet  Commonly known as: REMERON     MULTIPLE VITAMINS DAILY ORAL     omega-3 fatty acids 1,000 mg Cap     omeprazole 20 MG capsule  Commonly known as: PRILOSEC     prazosin 5 MG capsule  Commonly known as: MINIPRESS     venlafaxine 100 MG Tab  Commonly known as: EFFEXOR     VITAMIN B COMPLEX ORAL         * This list has 4 medication(s) that are the same as other medications prescribed for you. Read the  directions carefully, and ask your doctor or other care provider to review them with you.              Audio Only Telehealth Visit     The patient location is: [place]   The chief complaint leading to consultation is: [facility] follow up  Visit type: Virtual visit with audio only (telephone)  Total time spent with patient: 15 min      The reason for the audio only service rather than synchronous audio and video virtual visit was related to technical difficulties or patient preference/necessity.     Each patient to whom I provide medical services by telemedicine is:  (1) informed of the relationship between the physician and patient and the respective role of any other health care provider with respect to management of the patient; and (2) notified that they may decline to receive medical services by telemedicine and may withdraw from such care at any time. Patient verbally consented to receive this service via voice-only telephone call.        This service was not originating from a related E/M service provided within the previous 7 days nor will  to an E/M service or procedure within the next 24 hours or my soonest available appointment.  Prevailing standard of care was able to be met in this audio-only visit.      Signing Physician:  Elyssa Jackson MD

## 2022-01-27 ENCOUNTER — TELEPHONE (OUTPATIENT)
Dept: CARDIOLOGY | Facility: CLINIC | Age: 77
End: 2022-01-27
Payer: MEDICARE

## 2022-02-07 NOTE — DISCHARGE SUMMARY
[facility] Medicine  Discharge Summary      Patient Name: Hilario Vivar  MRN: [id number]  Patient Class: IP- Inpatient  Admission Date: 1/14/2022  Hospital Length of Stay: 4 days  Discharge Date and Time: 1/18/2022  4:36 PM  Attending Physician: No att. providers found   Discharging Provider: Ayana Moulton MD  Primary Care Provider: Arthur Cavazos MD      HPI:   Hilario Vivar is a 76 year old male with a PMHx of Hilario Vivar is a 76 y.o. male patient with a past medical history of CAD (AMI 1980's, proximal LAD PCI 1995, distal circ stents 2005), PAF/atrial flutter s/p ablation, HTN, HLD, Osteoarthritis and DM who presented to [facility] ~ 9:30 am on 1/14/21 with complaints of chest pain, unrelieved by nitroglycerin SL X 3 and 4 81 mg Aspirin. The chest pain started about 4:30 am. Patient reports taking nitro and aspiring but with no relief of pain. Denies pain radiation reports substernal pain. Pain was reported 6/10 then decreased to 2/10 after morphine and  mg. While there he was noted to have VS (0627 h) HR 49, /67, RR 18, SpO2 96% (RA).  Labs:  CBC and CMP unremarkable except for Glu 235, troponin 0.006 > 0.065, BNP 22, COVID neg. EKG sinus bradycardia (48) with no acute changes, CXR clear. Patient given aspirin (324) and morphine. Over the next few h the pain improved (6 > 2).  Patient resting comfortably.  Transfer requested for higher level of care (cardiology).                       Procedure(s) (LRB):  Left heart cath (N/A)  ANGIOGRAM, CORONARY ARTERY (N/A)  IVUS, Coronary  Stent, Drug Eluting, Single Vessel, Coronary  Ventriculogram, Left      Hospital Course:   He was [facility] for further care. Troponin was elevated but now declining. He was loaded with Plavix and started on full dose Lovenox. Cardiology was consulted. He had a left heart cath through the right radial, he had 1 stent placed in the distal LCX and remained in  stable condition afterwards. He was discharged home in stable condition with aspirin and plavix and statin.       Goals of Care Treatment Preferences:  Code Status: Full Code      Consults:   Consults (From admission, onward)        Status Ordering Provider     Inpatient virtual consult to Hospital Medicine  Once        Provider:  (Not yet assigned)    Completed [facility]     Inpatient consult to Cardiology-[facility]  Once        Provider:  Rivas Conner MD    Completed AUBREY LEONARD          No new Assessment & Plan notes have been filed under this hospital service since the last note was generated.  Service: Hospital Medicine    Final Active Diagnoses:    Diagnosis Date Noted POA    PRINCIPAL PROBLEM:  NSTEMI (non-ST elevated myocardial infarction) [I21.4] 01/14/2022 Yes    Coronary artery disease involving native coronary artery [I25.10] 01/14/2022 Yes     Chronic    HTN (hypertension) [I10] 01/14/2022 Yes    Type 2 diabetes mellitus, without long-term current use of insulin [E11.9] 01/14/2022 Yes    Depression [F32.A] 01/14/2022 Yes    PTSD (post-traumatic stress disorder) [F43.10] 01/14/2022 Yes    Sleep apnea [G47.30] 01/14/2022 Yes     Chronic      Problems Resolved During this Admission:    Diagnosis Date Noted Date Resolved POA    ACS (acute coronary syndrome) [I24.9] 01/15/2022 01/15/2022 Yes       Discharged Condition: stable    Disposition: Home or Self Care    Follow Up:   Follow-up Information     Arthur Cavazos MD In 1 week.    Specialty: Interventional Cardiology  Contact information:  [address] [facility] [address]  [phone]                       Patient Instructions:      Ambulatory referral/consult to Cardiology   Standing Status: Future   Referral Priority: Routine Referral Type: Consultation   Referral Reason: Specialty Services Required   Requested Specialty: Cardiology   Number of Visits Requested: 1       Significant Diagnostic Studies: Labs: All  labs within the past 24 hours have been reviewed    Pending Diagnostic Studies:     None         Medications:  Reconciled Home Medications:      Medication List      START taking these medications    clopidogreL 75 mg tablet  Commonly known as: PLAVIX  Take 1 tablet (75 mg total) by mouth once daily.        CHANGE how you take these medications    coenzyme Q10 200 mg capsule  Take 200 mg by mouth.  What changed: Another medication with the same name was removed. Continue taking this medication, and follow the directions you see here.        CONTINUE taking these medications    acetaminophen 500 MG tablet  Commonly known as: TYLENOL  Take 500 mg by mouth every 4 (four) hours as needed.     aspirin 81 MG EC tablet  Commonly known as: ECOTRIN  Take 81 mg by mouth once daily.     atorvastatin 40 MG tablet  Commonly known as: LIPITOR  Take 1 tablet (40 mg total) by mouth once daily.     * canagliflozin 300 mg Tab tablet  Commonly known as: INVOKANA  Take 1 tablet by mouth once daily.     * INVOKANA 300 mg Tab tablet  Generic drug: canagliflozin     * cholecalciferol (vitamin D3) 50 mcg (2,000 unit) Cap  Commonly known as: VITAMIN D3  Take by mouth.     * vitamin D 1000 units Tab  Commonly known as: VITAMIN D3  Take by mouth.     docosahexaenoic acid-epa 120-180 mg Cap  Take 1,000 mg by mouth.     doxepin 10 MG capsule  Commonly known as: SINEQUAN  Take 10 mg by mouth.     FLOMAX ORAL  Take by mouth.     glipiZIDE 10 MG tablet  Commonly known as: GLUCOTROL  Take 10 mg by mouth 2 (two) times daily before meals.     INV doxazosin 8 MG Tab  Commonly known as: CARDURA  Take 4 mg by mouth once daily. FOR INVESTIGATIONAL USE ONLY     lisinopriL 10 MG tablet  Take 10 mg by mouth once daily.     magnesium oxide 400 mg (241.3 mg magnesium) tablet  Commonly known as: MAG-OX  Take 400 mg by mouth once daily.     meloxicam 15 MG tablet  Commonly known as: MOBIC  Take 15 mg by mouth once daily.     metFORMIN 1000 MG tablet  Commonly  known as: GLUCOPHAGE  Take 1,000 mg by mouth 2 (two) times daily with meals.     metoprolol tartrate 25 MG tablet  Commonly known as: LOPRESSOR  Take 25 mg by mouth.     mirtazapine 30 MG tablet  Commonly known as: REMERON  Take 30 mg by mouth.     MULTIPLE VITAMINS DAILY ORAL  Take by mouth.     omega-3 fatty acids 1,000 mg Cap  Take 2 g by mouth.     omeprazole 20 MG capsule  Commonly known as: PRILOSEC  Take 1 capsule by mouth once daily.     prazosin 5 MG capsule  Commonly known as: MINIPRESS  Take 5 mg by mouth every evening.     venlafaxine 100 MG Tab  Commonly known as: EFFEXOR  Take by mouth 2 (two) times daily.     VITAMIN B COMPLEX ORAL  Take by mouth.         * This list has 4 medication(s) that are the same as other medications prescribed for you. Read the directions carefully, and ask your doctor or other care provider to review them with you.            STOP taking these medications    amoxicillin 500 MG Tab  Commonly known as: AMOXIL     fish oil-omega-3 fatty acids 300-1,000 mg capsule     rosuvastatin 20 MG tablet  Commonly known as: CRESTOR            Indwelling Lines/Drains at time of discharge:   Lines/Drains/Airways     None                 Time spent on the discharge of patient: > 35 minutes         The attending portion of this evaluation, treatment, and documentation was performed per Ayana Moulton MD via Telemedicine [employer] using the secure [employer] software platform with 2 way audio/video. The provider was located off-site and the patient is located in the hospital. The aforementioned video software was utilized to document the relevant history and physical exam    Ayana Moulton MD  Department of Hospital Medicine  [facility]

## 2022-02-12 ENCOUNTER — PATIENT MESSAGE (OUTPATIENT)
Dept: CARDIOLOGY | Facility: CLINIC | Age: 77
End: 2022-02-12
Payer: MEDICARE

## 2022-02-14 ENCOUNTER — TELEPHONE (OUTPATIENT)
Dept: CARDIOLOGY | Facility: CLINIC | Age: 77
End: 2022-02-14
Payer: MEDICARE

## 2022-02-14 NOTE — TELEPHONE ENCOUNTER
Spoke with pt wife    Would like to cancel and are out of town   Pt plans to follow up with cardiologist in [place]    No further questions     ND

## 2022-02-14 NOTE — TELEPHONE ENCOUNTER
Call was transferred from call center    Spoke with pt wife  Wife stated they are in [place] at the moment and traveling at this time  Pt will follow up with cardiologist in [place] in regards to stent placed by Dr. Kay      No further questions at this time    ND

## 2022-02-14 NOTE — TELEPHONE ENCOUNTER
----- Message from Lorenza Zambrano sent at 2/14/2022 10:59 AM CST -----  Contact: Marilee ( Wife)-[phone]  Type:  Needs Medical Advice    Who Called: Pt's wife  Reason for call; return call to the office  Would the patient rather a call back or a response via [facility]?  No call back  Best Call Back Number: [phone]

## 2025-06-18 NOTE — NURSING
2 cc of air removed from right radial vasc band. No hematoma noted. Will continue to monitor.     No

## (undated) DEVICE — TUBING HPCIL ROT M/F ADPT 48IN

## (undated) DEVICE — KIT INTRODUCER W/GUIDEWIRE

## (undated) DEVICE — PAD DEFIB CADENCE ADULT R2

## (undated) DEVICE — CONTRAST VISIPAQUE 150ML

## (undated) DEVICE — VISE RADIFOCUS MULTI TORQUE

## (undated) DEVICE — GUIDE LAUNCHER 6FR EBU 3.5

## (undated) DEVICE — VALVE CONTROL COPILOT

## (undated) DEVICE — SEE MEDLINE ITEM 157187

## (undated) DEVICE — COVER PROBE US 5.5X58L NON LTX

## (undated) DEVICE — CATH NC QUANTUM APEX MR 3.5X8

## (undated) DEVICE — KIT GLIDESHEATH SLEND 6FR 10CM

## (undated) DEVICE — GUIDEWIRE RUNTHROUGH EF 180CM

## (undated) DEVICE — CATH IMPULSE 5FR PIGTAIL 125CM

## (undated) DEVICE — CATH RADIAL TIG 6FR 4.0 110CM

## (undated) DEVICE — SYR MED RAD 150ML

## (undated) DEVICE — CATH NC QUANTUM APEX MR 3X12

## (undated) DEVICE — HEMOSTAT VASC BAND REG 24CM

## (undated) DEVICE — Device

## (undated) DEVICE — GUIDEWIRE WHOLEY STD STR 260CM

## (undated) DEVICE — CATH EAGLE EYE ST .014X20X150

## (undated) DEVICE — INFLATOR ENCORE 26 BLLN INFL

## (undated) DEVICE — KIT LEFT HEART MANIFOLD CUSTOM